# Patient Record
Sex: FEMALE | Race: WHITE | NOT HISPANIC OR LATINO | Employment: OTHER | URBAN - METROPOLITAN AREA
[De-identification: names, ages, dates, MRNs, and addresses within clinical notes are randomized per-mention and may not be internally consistent; named-entity substitution may affect disease eponyms.]

---

## 2023-03-01 LAB
ALANINE AMINOTRANSFERASE (SGPT) (U/L) IN SER/PLAS: 15 U/L (ref 7–45)
ALBUMIN (G/DL) IN SER/PLAS: 4.3 G/DL (ref 3.4–5)
ALKALINE PHOSPHATASE (U/L) IN SER/PLAS: 58 U/L (ref 33–136)
ANION GAP IN SER/PLAS: 12 MMOL/L (ref 10–20)
ASPARTATE AMINOTRANSFERASE (SGOT) (U/L) IN SER/PLAS: 25 U/L (ref 9–39)
BILIRUBIN TOTAL (MG/DL) IN SER/PLAS: 0.7 MG/DL (ref 0–1.2)
CALCIDIOL (25 OH VITAMIN D3) (NG/ML) IN SER/PLAS: 34 NG/ML
CALCIUM (MG/DL) IN SER/PLAS: 10.2 MG/DL (ref 8.6–10.3)
CARBON DIOXIDE, TOTAL (MMOL/L) IN SER/PLAS: 27 MMOL/L (ref 21–32)
CHLORIDE (MMOL/L) IN SER/PLAS: 103 MMOL/L (ref 98–107)
CHOLESTEROL (MG/DL) IN SER/PLAS: 228 MG/DL (ref 0–199)
CHOLESTEROL IN HDL (MG/DL) IN SER/PLAS: 64.6 MG/DL
CHOLESTEROL/HDL RATIO: 3.5
CREATININE (MG/DL) IN SER/PLAS: 0.94 MG/DL (ref 0.5–1.05)
ERYTHROCYTE DISTRIBUTION WIDTH (RATIO) BY AUTOMATED COUNT: 15.4 % (ref 11.5–14.5)
ERYTHROCYTE MEAN CORPUSCULAR HEMOGLOBIN CONCENTRATION (G/DL) BY AUTOMATED: 32.4 G/DL (ref 32–36)
ERYTHROCYTE MEAN CORPUSCULAR VOLUME (FL) BY AUTOMATED COUNT: 89 FL (ref 80–100)
ERYTHROCYTES (10*6/UL) IN BLOOD BY AUTOMATED COUNT: 4.6 X10E12/L (ref 4–5.2)
GFR FEMALE: 64 ML/MIN/1.73M2
GLUCOSE (MG/DL) IN SER/PLAS: 98 MG/DL (ref 74–99)
HEMATOCRIT (%) IN BLOOD BY AUTOMATED COUNT: 41.1 % (ref 36–46)
HEMOGLOBIN (G/DL) IN BLOOD: 13.3 G/DL (ref 12–16)
LDL: 125 MG/DL (ref 0–99)
LEUKOCYTES (10*3/UL) IN BLOOD BY AUTOMATED COUNT: 5.3 X10E9/L (ref 4.4–11.3)
PLATELETS (10*3/UL) IN BLOOD AUTOMATED COUNT: 250 X10E9/L (ref 150–450)
POTASSIUM (MMOL/L) IN SER/PLAS: 4.2 MMOL/L (ref 3.5–5.3)
PROTEIN TOTAL: 7.6 G/DL (ref 6.4–8.2)
SODIUM (MMOL/L) IN SER/PLAS: 138 MMOL/L (ref 136–145)
THYROTROPIN (MIU/L) IN SER/PLAS BY DETECTION LIMIT <= 0.05 MIU/L: 1.5 MIU/L (ref 0.44–3.98)
TRIGLYCERIDE (MG/DL) IN SER/PLAS: 190 MG/DL (ref 0–149)
UREA NITROGEN (MG/DL) IN SER/PLAS: 18 MG/DL (ref 6–23)
VLDL: 38 MG/DL (ref 0–40)

## 2023-07-17 PROBLEM — I83.819 VARICOSE VEINS OF LEG WITH PAIN: Status: ACTIVE | Noted: 2023-07-17

## 2023-07-17 PROBLEM — N39.0 ACUTE UTI: Status: ACTIVE | Noted: 2023-07-17

## 2023-07-17 PROBLEM — R06.02 SHORTNESS OF BREATH: Status: ACTIVE | Noted: 2023-07-17

## 2023-07-17 PROBLEM — J06.9 URI, ACUTE: Status: ACTIVE | Noted: 2023-07-17

## 2023-07-17 PROBLEM — M20.42 ACQUIRED HAMMERTOE OF LEFT FOOT: Status: ACTIVE | Noted: 2023-07-17

## 2023-07-17 PROBLEM — L25.5 DERMATITIS DUE TO PLANTS, INCLUDING POISON IVY, SUMAC, AND OAK: Status: ACTIVE | Noted: 2023-07-17

## 2023-07-17 PROBLEM — M47.816 LUMBAR SPONDYLOSIS: Status: ACTIVE | Noted: 2023-07-17

## 2023-07-17 PROBLEM — R53.83 FATIGUE: Status: ACTIVE | Noted: 2023-07-17

## 2023-07-17 PROBLEM — H61.23 BILATERAL IMPACTED CERUMEN: Status: ACTIVE | Noted: 2023-07-17

## 2023-07-17 PROBLEM — L23.7 POISON IVY DERMATITIS: Status: ACTIVE | Noted: 2023-07-17

## 2023-07-17 PROBLEM — N39.0 URINARY TRACT INFECTION: Status: ACTIVE | Noted: 2023-07-17

## 2023-07-17 PROBLEM — R32 URINARY INCONTINENCE: Status: ACTIVE | Noted: 2023-07-17

## 2023-07-17 PROBLEM — F33.1 MAJOR DEPRESSIVE DISORDER, RECURRENT, MODERATE (MULTI): Status: ACTIVE | Noted: 2023-07-17

## 2023-07-17 PROBLEM — M54.30 SCIATICA: Status: ACTIVE | Noted: 2023-07-17

## 2023-07-17 PROBLEM — R39.9 UTI SYMPTOMS: Status: ACTIVE | Noted: 2023-07-17

## 2023-07-17 PROBLEM — R92.2 INCONCLUSIVE MAMMOGRAM: Status: ACTIVE | Noted: 2023-07-17

## 2023-07-17 PROBLEM — K64.4 EXTERNAL HEMORRHOIDS: Status: ACTIVE | Noted: 2023-07-17

## 2023-07-17 PROBLEM — M25.512 LEFT SHOULDER PAIN: Status: ACTIVE | Noted: 2023-07-17

## 2023-07-17 PROBLEM — L98.9 SKIN LESIONS: Status: ACTIVE | Noted: 2023-07-17

## 2023-07-17 PROBLEM — K64.8 INTERNAL HEMORRHOIDS: Status: ACTIVE | Noted: 2023-07-17

## 2023-07-17 PROBLEM — M20.12 HALLUX VALGUS, LEFT: Status: ACTIVE | Noted: 2023-07-17

## 2023-07-17 PROBLEM — F32.A DEPRESSION: Status: ACTIVE | Noted: 2023-07-17

## 2023-07-17 PROBLEM — M54.16 LUMBAR RADICULITIS: Status: ACTIVE | Noted: 2023-07-17

## 2023-07-17 PROBLEM — B37.0 THRUSH: Status: ACTIVE | Noted: 2023-07-17

## 2023-07-17 PROBLEM — H93.11 TINNITUS OF RIGHT EAR: Status: ACTIVE | Noted: 2023-07-17

## 2023-07-17 PROBLEM — R06.2 WHEEZE: Status: ACTIVE | Noted: 2023-07-17

## 2023-07-17 PROBLEM — L85.3 DRY SKIN: Status: ACTIVE | Noted: 2023-07-17

## 2023-07-17 PROBLEM — H91.93 HEARING PROBLEM OF BOTH EARS: Status: ACTIVE | Noted: 2023-07-17

## 2023-07-17 PROBLEM — S20.219A RIB CONTUSION: Status: ACTIVE | Noted: 2023-07-17

## 2023-07-17 PROBLEM — S61.219A FINGER LACERATION: Status: ACTIVE | Noted: 2023-07-17

## 2023-07-17 PROBLEM — D12.6 TUBULAR ADENOMA OF COLON: Status: ACTIVE | Noted: 2023-07-17

## 2023-07-17 PROBLEM — M26.659 TMJ ARTHROPATHY: Status: ACTIVE | Noted: 2023-07-17

## 2023-07-17 PROBLEM — E78.5 HYPERLIPIDEMIA: Status: ACTIVE | Noted: 2023-07-17

## 2023-07-17 PROBLEM — E55.9 VITAMIN D DEFICIENCY: Status: ACTIVE | Noted: 2023-07-17

## 2023-07-17 PROBLEM — M53.3 SI (SACROILIAC) JOINT DYSFUNCTION: Status: ACTIVE | Noted: 2023-07-17

## 2023-07-17 PROBLEM — I49.3 PREMATURE VENTRICULAR CONTRACTION ON ELECTROCARDIOGRAM: Status: ACTIVE | Noted: 2023-07-17

## 2023-07-17 PROBLEM — M16.9 OSTEOARTHRITIS OF HIP: Status: ACTIVE | Noted: 2023-07-17

## 2023-07-17 PROBLEM — M85.80 OSTEOPENIA: Status: ACTIVE | Noted: 2023-07-17

## 2023-07-17 PROBLEM — J02.0 ACUTE STREPTOCOCCAL PHARYNGITIS: Status: ACTIVE | Noted: 2023-07-17

## 2023-07-17 PROBLEM — J02.9 SORE THROAT: Status: ACTIVE | Noted: 2023-07-17

## 2023-07-17 PROBLEM — K62.5 RECTAL BLEEDING: Status: ACTIVE | Noted: 2023-07-17

## 2023-07-17 RX ORDER — CHOLECALCIFEROL (VITAMIN D3) 25 MCG
TABLET ORAL
COMMUNITY
Start: 2020-01-14

## 2023-07-17 RX ORDER — CLOTRIMAZOLE 10 MG/1
LOZENGE ORAL; TOPICAL
COMMUNITY
Start: 2022-07-15 | End: 2023-07-19 | Stop reason: ALTCHOICE

## 2023-07-17 RX ORDER — NAPROXEN 500 MG/1
TABLET ORAL EVERY 12 HOURS
COMMUNITY
Start: 2015-04-07 | End: 2023-07-19 | Stop reason: SDUPTHER

## 2023-07-17 RX ORDER — FLUOXETINE HYDROCHLORIDE 20 MG/1
1 CAPSULE ORAL DAILY
COMMUNITY
Start: 2014-02-17 | End: 2024-01-19 | Stop reason: SDUPTHER

## 2023-07-17 RX ORDER — GABAPENTIN 100 MG/1
1 CAPSULE ORAL 3 TIMES DAILY
COMMUNITY
Start: 2022-01-27 | End: 2023-07-19 | Stop reason: SDUPTHER

## 2023-07-19 ENCOUNTER — OFFICE VISIT (OUTPATIENT)
Dept: PRIMARY CARE | Facility: CLINIC | Age: 73
End: 2023-07-19
Payer: MEDICARE

## 2023-07-19 VITALS
WEIGHT: 153.2 LBS | HEART RATE: 77 BPM | HEIGHT: 64 IN | OXYGEN SATURATION: 95 % | TEMPERATURE: 97.9 F | DIASTOLIC BLOOD PRESSURE: 71 MMHG | RESPIRATION RATE: 14 BRPM | SYSTOLIC BLOOD PRESSURE: 120 MMHG | BODY MASS INDEX: 26.15 KG/M2

## 2023-07-19 DIAGNOSIS — R41.3 MEMORY CHANGES: ICD-10-CM

## 2023-07-19 DIAGNOSIS — M54.16 LUMBAR RADICULITIS: Primary | ICD-10-CM

## 2023-07-19 DIAGNOSIS — E78.2 MIXED HYPERLIPIDEMIA: ICD-10-CM

## 2023-07-19 DIAGNOSIS — F33.1 MAJOR DEPRESSIVE DISORDER, RECURRENT, MODERATE (MULTI): ICD-10-CM

## 2023-07-19 DIAGNOSIS — I83.813 VARICOSE VEINS OF BOTH LOWER EXTREMITIES WITH PAIN: ICD-10-CM

## 2023-07-19 PROCEDURE — 1036F TOBACCO NON-USER: CPT | Performed by: INTERNAL MEDICINE

## 2023-07-19 PROCEDURE — 99214 OFFICE O/P EST MOD 30 MIN: CPT | Performed by: INTERNAL MEDICINE

## 2023-07-19 PROCEDURE — 1160F RVW MEDS BY RX/DR IN RCRD: CPT | Performed by: INTERNAL MEDICINE

## 2023-07-19 PROCEDURE — 1159F MED LIST DOCD IN RCRD: CPT | Performed by: INTERNAL MEDICINE

## 2023-07-19 PROCEDURE — 1125F AMNT PAIN NOTED PAIN PRSNT: CPT | Performed by: INTERNAL MEDICINE

## 2023-07-19 RX ORDER — NAPROXEN 500 MG/1
500 TABLET ORAL EVERY 12 HOURS
Qty: 90 TABLET | Refills: 3 | Status: SHIPPED | OUTPATIENT
Start: 2023-07-19

## 2023-07-19 RX ORDER — GABAPENTIN 100 MG/1
100 CAPSULE ORAL 3 TIMES DAILY
Qty: 270 CAPSULE | Refills: 0 | Status: SHIPPED | OUTPATIENT
Start: 2023-07-19 | End: 2023-12-07

## 2023-07-19 ASSESSMENT — ENCOUNTER SYMPTOMS
EYE REDNESS: 0
CARDIOVASCULAR NEGATIVE: 1
COLOR CHANGE: 0
EYE PAIN: 0
JOINT SWELLING: 0
POLYPHAGIA: 0
FLANK PAIN: 0
SEIZURES: 0
BLOOD IN STOOL: 0
OCCASIONAL FEELINGS OF UNSTEADINESS: 0
FACIAL ASYMMETRY: 0
DIFFICULTY URINATING: 0
WOUND: 0
VOICE CHANGE: 0
NERVOUS/ANXIOUS: 0
SHORTNESS OF BREATH: 0
BRUISES/BLEEDS EASILY: 0
LIGHT-HEADEDNESS: 0
NEUROLOGICAL NEGATIVE: 1
COUGH: 0
PALPITATIONS: 0
ABDOMINAL PAIN: 0
VOMITING: 0
APPETITE CHANGE: 0
BACK PAIN: 1
ALLERGIC/IMMUNOLOGIC NEGATIVE: 1
NAUSEA: 0
TROUBLE SWALLOWING: 0
WEAKNESS: 0
SINUS PRESSURE: 0
WHEEZING: 0
EYES NEGATIVE: 1
HEMATOLOGIC/LYMPHATIC NEGATIVE: 1
LOSS OF SENSATION IN FEET: 0
ARTHRALGIAS: 1
DIZZINESS: 0
CONFUSION: 0
EYE DISCHARGE: 0
HALLUCINATIONS: 0
CONSTIPATION: 0
TREMORS: 0
AGITATION: 0
SLEEP DISTURBANCE: 0
NECK PAIN: 0
DYSURIA: 0
MYALGIAS: 0
DEPRESSION: 0
DIARRHEA: 0
HEADACHES: 0
PSYCHIATRIC NEGATIVE: 1
STRIDOR: 0
NECK STIFFNESS: 0
ADENOPATHY: 0
POLYDIPSIA: 0
ENDOCRINE NEGATIVE: 1
SORE THROAT: 0
FREQUENCY: 0
ACTIVITY CHANGE: 0
SINUS PAIN: 0
UNEXPECTED WEIGHT CHANGE: 0
NUMBNESS: 0
CHEST TIGHTNESS: 0
GASTROINTESTINAL NEGATIVE: 1
SPEECH DIFFICULTY: 0
RESPIRATORY NEGATIVE: 1
CONSTITUTIONAL NEGATIVE: 1

## 2023-07-19 ASSESSMENT — PATIENT HEALTH QUESTIONNAIRE - PHQ9
SUM OF ALL RESPONSES TO PHQ9 QUESTIONS 1 AND 2: 0
2. FEELING DOWN, DEPRESSED OR HOPELESS: NOT AT ALL
1. LITTLE INTEREST OR PLEASURE IN DOING THINGS: NOT AT ALL

## 2023-07-19 NOTE — PROGRESS NOTES
Subjective   Patient ID: Zaki Terry is a 72 y.o. female who presents for Follow-up (Patient is here for a follow up./Patient states she is forgetting words and can't finish a sentence. ).  HPI    Patient comes for f/up visit.   Patient has been feeling pretty good and has been complaint with prescribed medications.  She and her  noticed progressive difficulties remembering words and finishing sentences.  No family h/o dementia. There is a fam h/o CVA, CAD.    Her 98 yo mother is still alive. There is a h/o longevity on mother side of family.       Patient is requesting refill on gabapentin initially prescribed by pain management, she no longer sees pain management, gabapentin helps with radiculopathy sx.     She continues fluoxetine for anxiet sx.     Review of Systems   Constitutional: Negative.  Negative for activity change, appetite change and unexpected weight change.   HENT: Negative.  Negative for congestion, ear discharge, ear pain, hearing loss, mouth sores, nosebleeds, sinus pressure, sinus pain, sore throat, trouble swallowing and voice change.    Eyes: Negative.  Negative for pain, discharge, redness and visual disturbance.   Respiratory: Negative.  Negative for cough, chest tightness, shortness of breath, wheezing and stridor.    Cardiovascular: Negative.  Negative for chest pain, palpitations and leg swelling.   Gastrointestinal: Negative.  Negative for abdominal pain, blood in stool, constipation, diarrhea, nausea and vomiting.   Endocrine: Negative.  Negative for polydipsia, polyphagia and polyuria.   Genitourinary: Negative.  Negative for difficulty urinating, dysuria, flank pain, frequency and urgency.   Musculoskeletal:  Positive for arthralgias and back pain. Negative for gait problem, joint swelling, myalgias, neck pain and neck stiffness.   Skin: Negative.  Negative for color change, rash and wound.   Allergic/Immunologic: Negative.  Negative for environmental allergies, food  "allergies and immunocompromised state.   Neurological: Negative.  Negative for dizziness, tremors, seizures, syncope, facial asymmetry, speech difficulty, weakness, light-headedness, numbness and headaches.   Hematological: Negative.  Negative for adenopathy. Does not bruise/bleed easily.   Psychiatric/Behavioral: Negative.  Negative for agitation, behavioral problems, confusion, hallucinations, sleep disturbance and suicidal ideas. The patient is not nervous/anxious.    All other systems reviewed and are negative.      Objective     Review of systems was performed and all systems were negative except what in HPI    /71   Pulse 77   Temp 36.6 °C (97.9 °F)   Resp 14   Ht 1.613 m (5' 3.5\")   Wt 69.5 kg (153 lb 3.2 oz)   SpO2 95%   BMI 26.71 kg/m²    Physical Exam  Vitals and nursing note reviewed.   Constitutional:       General: She is not in acute distress.     Appearance: Normal appearance.   HENT:      Head: Normocephalic and atraumatic.      Right Ear: External ear normal.      Left Ear: External ear normal.      Nose: Nose normal. No congestion or rhinorrhea.   Eyes:      General:         Right eye: No discharge.         Left eye: No discharge.      Extraocular Movements: Extraocular movements intact.      Conjunctiva/sclera: Conjunctivae normal.      Pupils: Pupils are equal, round, and reactive to light.   Cardiovascular:      Rate and Rhythm: Normal rate and regular rhythm.      Pulses: Normal pulses.      Heart sounds: Normal heart sounds. No murmur heard.     No friction rub. No gallop.   Pulmonary:      Effort: Pulmonary effort is normal. No respiratory distress.      Breath sounds: Normal breath sounds. No stridor. No wheezing, rhonchi or rales.   Chest:      Chest wall: No tenderness.   Abdominal:      General: Bowel sounds are normal.      Palpations: Abdomen is soft. There is no mass.      Tenderness: There is no abdominal tenderness. There is no guarding or rebound.   Musculoskeletal:    "      General: No swelling or deformity. Normal range of motion.      Cervical back: Normal range of motion and neck supple. No rigidity or tenderness.      Right lower leg: No edema.      Left lower leg: No edema.   Lymphadenopathy:      Cervical: No cervical adenopathy.   Skin:     General: Skin is warm and dry.      Coloration: Skin is not jaundiced.      Findings: No bruising, erythema or rash.   Neurological:      General: No focal deficit present.      Mental Status: She is alert and oriented to person, place, and time. Mental status is at baseline.      Cranial Nerves: No cranial nerve deficit.      Motor: No weakness.      Coordination: Coordination normal.      Gait: Gait normal.   Psychiatric:         Mood and Affect: Mood normal.         Behavior: Behavior normal.         Thought Content: Thought content normal.         Judgment: Judgment normal.         Assessment/Plan   Problem List Items Addressed This Visit          Cardiac and Vasculature    Hyperlipidemia     Low cholesterol and low carbohydrate diet is advised.          Varicose veins of leg with pain     Compression stockings advised.             Mental Health    Major depressive disorder, recurrent, moderate (CMS/HCC)     Clinically stable. Continue Fuoxetine. Will monitor.             Neuro    Lumbar radiculitis - Primary    Relevant Medications    gabapentin (Neurontin) 100 mg capsule    naproxen (Naprosyn) 500 mg tablet    Memory changes    Relevant Orders    CT head wo IV contrast    Referral to Neurology   It was a pleasure to see you today.  I would like to remind you about importance of a healthy lifestyle in order to improve your well-being and live longer.  Try to engage in physical activities for at least 150 minutes per week.  Eat about 10 servings of fruits and vegetables daily. My advice is 2 servings of fruits and 8 servings of vegetables.  For vegetables choose at least half of them green and at least half of them fresh.  Please  avoid sugar, salt, fried food and saturated fat.    I spent a total of 30 minutes on the date of service for follow up visit, which included preparing to see the patient, face-to-face patient care, completing clinical documentation, obtaining and/or reviewing separately obtained history, performing a medically appropriate examination, counseling and educating the patient/family/caregiver, ordering medications, tests, or procedures, communicating with other health care providers (not separately reported), independently interpreting results (not separately reported), communicating results to the patient/family/caregiver, and care coordination (not separately reported).      F/up in jan 2024 or sooner if needed.

## 2023-10-30 ENCOUNTER — ANCILLARY PROCEDURE (OUTPATIENT)
Dept: RADIOLOGY | Facility: CLINIC | Age: 73
End: 2023-10-30
Payer: MEDICARE

## 2023-10-30 DIAGNOSIS — R41.3 OTHER AMNESIA: ICD-10-CM

## 2023-10-30 PROCEDURE — 70450 CT HEAD/BRAIN W/O DYE: CPT | Performed by: RADIOLOGY

## 2023-10-30 PROCEDURE — 70450 CT HEAD/BRAIN W/O DYE: CPT

## 2023-11-20 ENCOUNTER — OFFICE VISIT (OUTPATIENT)
Dept: NEUROLOGY | Facility: CLINIC | Age: 73
End: 2023-11-20
Payer: MEDICARE

## 2023-11-20 ENCOUNTER — LAB (OUTPATIENT)
Dept: LAB | Facility: LAB | Age: 73
End: 2023-11-20
Payer: MEDICARE

## 2023-11-20 VITALS
TEMPERATURE: 97.3 F | HEART RATE: 56 BPM | SYSTOLIC BLOOD PRESSURE: 145 MMHG | WEIGHT: 155.5 LBS | DIASTOLIC BLOOD PRESSURE: 78 MMHG | BODY MASS INDEX: 27.55 KG/M2 | HEIGHT: 63 IN

## 2023-11-20 DIAGNOSIS — R41.89 COGNITIVE CHANGES: ICD-10-CM

## 2023-11-20 DIAGNOSIS — R41.89 COGNITIVE CHANGES: Primary | ICD-10-CM

## 2023-11-20 LAB — VIT B12 SERPL-MCNC: 746 PG/ML (ref 211–911)

## 2023-11-20 PROCEDURE — 99204 OFFICE O/P NEW MOD 45 MIN: CPT | Performed by: PSYCHIATRY & NEUROLOGY

## 2023-11-20 PROCEDURE — 1125F AMNT PAIN NOTED PAIN PRSNT: CPT | Performed by: PSYCHIATRY & NEUROLOGY

## 2023-11-20 PROCEDURE — 82607 VITAMIN B-12: CPT

## 2023-11-20 PROCEDURE — 1036F TOBACCO NON-USER: CPT | Performed by: PSYCHIATRY & NEUROLOGY

## 2023-11-20 PROCEDURE — 36415 COLL VENOUS BLD VENIPUNCTURE: CPT

## 2023-11-20 PROCEDURE — 1160F RVW MEDS BY RX/DR IN RCRD: CPT | Performed by: PSYCHIATRY & NEUROLOGY

## 2023-11-20 PROCEDURE — 1159F MED LIST DOCD IN RCRD: CPT | Performed by: PSYCHIATRY & NEUROLOGY

## 2023-11-20 ASSESSMENT — PATIENT HEALTH QUESTIONNAIRE - PHQ9
SUM OF ALL RESPONSES TO PHQ9 QUESTIONS 1 & 2: 0
1. LITTLE INTEREST OR PLEASURE IN DOING THINGS: NOT AT ALL
2. FEELING DOWN, DEPRESSED OR HOPELESS: NOT AT ALL

## 2023-11-20 ASSESSMENT — LIFESTYLE VARIABLES
SKIP TO QUESTIONS 9-10: 1
HOW MANY STANDARD DRINKS CONTAINING ALCOHOL DO YOU HAVE ON A TYPICAL DAY: 1 OR 2
HOW OFTEN DO YOU HAVE A DRINK CONTAINING ALCOHOL: MONTHLY OR LESS
AUDIT-C TOTAL SCORE: 1
HOW OFTEN DO YOU HAVE SIX OR MORE DRINKS ON ONE OCCASION: NEVER

## 2023-11-20 NOTE — PROGRESS NOTES
Consulting Physician: Dr. Carnes    Chief Complaint: Cognitive Changes    History Of Present Illness  Zaki Terry is a 72 y.o. female presenting with cognitive changes.    Over the past year, she has noted changes in cognition.  She describes word finding difficulty.  For example, she will be in the middle of a sentence and she cannot think of the right word. She also notes that she often cannot finish a sentence.  She isn't sure if her mind is wandering which is the reason why she doesn't finish sentences.  She denies any difficulty understanding speech.  She also denies any problems with writing or reading.  She denies any difficulty with remembering recent conversations or recent events.  She does not tend to repeat herself.  She denies any difficulty with following instructions.  She denies any difficulty with driving.  During the day, she reads, goes swimming, helps her mother, cooks, mows the lawn, and cleans her house.  She manages her medications.  She also manages her mother's bills/finances.  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  The patient denies any double vision, loss of peripheral vision, slurred speech, difficulty getting the words out, facial droop, facial numbness, focal weakness, focal numbness, loss of coordination, or loss of balance.             Past Medical History  Past Medical History:   Diagnosis Date    Encounter for screening for malignant neoplasm of skin 10/18/2016    Skin cancer screening    Impacted cerumen, left ear 03/16/2022    Impacted cerumen of left ear    Impacted cerumen, right ear 03/16/2022    Impacted cerumen of right ear    Personal history of other medical treatment     History of blood transfusion    Unspecified hearing loss, bilateral 10/31/2017    Bilateral hearing loss    Unspecified hearing loss, right ear 03/16/2022    Hearing loss in right ear       Surgical History  Past Surgical History:   Procedure Laterality Date     "APPENDECTOMY  02/17/2014    Appendectomy    OTHER SURGICAL HISTORY  02/17/2014    Jaw Fracture Treatment Simple Mandible - Closed Reduction    OTHER SURGICAL HISTORY  01/20/2015    Endovenous Ablation Of Incompetent Vein Radiofrequency       Family History  Family History   Problem Relation Name Age of Onset    Hyperlipidemia Mother      Other (cardiac disorder) Father      Diabetes Father      Skin cancer Father      Diabetes Sister          Social History   reports that she has quit smoking. Her smoking use included cigarettes. She has never been exposed to tobacco smoke. She has never used smokeless tobacco. She reports current alcohol use. She reports that she does not use drugs.     Allergies  Meperidine    Medications    Current Outpatient Medications:     cholecalciferol (Vitamin D-3) 25 MCG (1000 UT) tablet, Take by mouth., Disp: , Rfl:     FLUoxetine (PROzac) 20 mg capsule, Take 1 capsule (20 mg) by mouth once daily., Disp: , Rfl:     naproxen (Naprosyn) 500 mg tablet, Take 1 tablet (500 mg) by mouth every 12 hours., Disp: 90 tablet, Rfl: 3    gabapentin (Neurontin) 100 mg capsule, Take 1 capsule (100 mg) by mouth 3 times a day., Disp: 270 capsule, Rfl: 0      Last Recorded Vitals   Blood pressure 145/78, pulse 56, temperature 36.3 °C (97.3 °F), temperature source Temporal, height 1.6 m (5' 3\"), weight 70.5 kg (155 lb 8 oz).    Objective:  Gen: NAD  Neuro:  --HIF:  Mini-Mental Status Exam:  Orientation to Time (Year, Season, Month, Date, Day of Week): 5  Orientation to Place (State, County, City, Name of Place, Floor):  5  Registration (Apple, Table, Lotus): 3  Attention (Spell 'World' backwards): 5  Delayed Verbal Recall: 2  Naming (Watch, Pen): 2  Repetition (No Ifs, Ands, or Buts): 1  Follows command with crossover: 3  Read a sentence: 1  Write a sentence: 1  Copy a figure: 1  Total Score:     --CN:  PERRLA, EOMI, VFF, no visible facial asymmetry, facial sensation intact, no tongue or palatal deviation, " "SCM intact  --Motor: Moves all 4 extremities equally; no focal deficits  --Sensory: Intact to light touch, intact to pinprick  --Reflex: 2+ symmetric, toes down  --Cerebellum: FTN and HTS intact  --Gait: Normal, narrow based.  Toe and Heal Walking Intact.  Tandem Intact    Relevant Results  Lab Results   Component Value Date    WBC 5.3 03/01/2023    HGB 13.3 03/01/2023    HCT 41.1 03/01/2023    MCV 89 03/01/2023     03/01/2023       Lab Results   Component Value Date    GLUCOSE 98 03/01/2023    CALCIUM 10.2 03/01/2023     03/01/2023    K 4.2 03/01/2023    CO2 27 03/01/2023     03/01/2023    BUN 18 03/01/2023    CREATININE 0.94 03/01/2023       No results found for: \"HGBA1C\"    Lab Results   Component Value Date    CHOL 228 (H) 03/01/2023    CHOL 196 01/28/2022    CHOL 238 (H) 02/02/2021     Lab Results   Component Value Date    HDL 64.6 03/01/2023    HDL 56.0 01/28/2022    HDL 59.0 02/02/2021     No results found for: \"LDLCALC\"  Lab Results   Component Value Date    TRIG 190 (H) 03/01/2023    TRIG 158 (H) 01/28/2022    TRIG 177 (H) 02/02/2021     No components found for: \"CHOLHDL\"       CT Brain (I personally reviewed the images/tracings with the following interpretation)  Mild atrophy noted; slight dilation of right temporal horn of the lateral ventricle    Assessment:  This is a 72 year old female presenting for evaluation of cognitive changes.  For the past year, she has noted difficulty with speech. Specifically, she notes word finding difficulty and completing sentences.  She is very independent.  On exam, her MMSE was 29/30.  Ddx include the onset of PPA.    Recommend NP testing  Check B12 level        Grant Pena MD  [facility]  Department of Neurology      A copy of this note was sent to the referring provider.    "

## 2023-12-07 DIAGNOSIS — M54.16 LUMBAR RADICULITIS: ICD-10-CM

## 2023-12-07 RX ORDER — GABAPENTIN 100 MG/1
100 CAPSULE ORAL 3 TIMES DAILY
Qty: 270 CAPSULE | Refills: 0 | Status: SHIPPED | OUTPATIENT
Start: 2023-12-07 | End: 2024-01-19 | Stop reason: SDUPTHER

## 2024-01-19 ENCOUNTER — OFFICE VISIT (OUTPATIENT)
Dept: PRIMARY CARE | Facility: CLINIC | Age: 74
End: 2024-01-19
Payer: MEDICARE

## 2024-01-19 ENCOUNTER — APPOINTMENT (OUTPATIENT)
Dept: PRIMARY CARE | Facility: CLINIC | Age: 74
End: 2024-01-19
Payer: MEDICARE

## 2024-01-19 VITALS
BODY MASS INDEX: 26.75 KG/M2 | DIASTOLIC BLOOD PRESSURE: 84 MMHG | HEART RATE: 81 BPM | TEMPERATURE: 96.6 F | WEIGHT: 151 LBS | OXYGEN SATURATION: 98 % | HEIGHT: 63 IN | SYSTOLIC BLOOD PRESSURE: 137 MMHG | RESPIRATION RATE: 16 BRPM

## 2024-01-19 DIAGNOSIS — M54.16 LUMBAR RADICULITIS: ICD-10-CM

## 2024-01-19 DIAGNOSIS — F33.1 MAJOR DEPRESSIVE DISORDER, RECURRENT, MODERATE (MULTI): ICD-10-CM

## 2024-01-19 DIAGNOSIS — Z12.31 ENCOUNTER FOR SCREENING MAMMOGRAM FOR BREAST CANCER: ICD-10-CM

## 2024-01-19 DIAGNOSIS — R53.83 OTHER FATIGUE: ICD-10-CM

## 2024-01-19 DIAGNOSIS — M85.89 OSTEOPENIA OF MULTIPLE SITES: ICD-10-CM

## 2024-01-19 DIAGNOSIS — E55.9 VITAMIN D DEFICIENCY: ICD-10-CM

## 2024-01-19 DIAGNOSIS — Z00.00 ROUTINE GENERAL MEDICAL EXAMINATION AT HEALTH CARE FACILITY: Primary | ICD-10-CM

## 2024-01-19 DIAGNOSIS — R41.3 MEMORY CHANGES: ICD-10-CM

## 2024-01-19 DIAGNOSIS — M21.949 DEFORMITY OF HAND, UNSPECIFIED LATERALITY: ICD-10-CM

## 2024-01-19 DIAGNOSIS — E78.2 MIXED HYPERLIPIDEMIA: ICD-10-CM

## 2024-01-19 DIAGNOSIS — Z78.0 ASYMPTOMATIC MENOPAUSAL STATE: ICD-10-CM

## 2024-01-19 PROCEDURE — 1036F TOBACCO NON-USER: CPT | Performed by: INTERNAL MEDICINE

## 2024-01-19 PROCEDURE — 1170F FXNL STATUS ASSESSED: CPT | Performed by: INTERNAL MEDICINE

## 2024-01-19 PROCEDURE — 1125F AMNT PAIN NOTED PAIN PRSNT: CPT | Performed by: INTERNAL MEDICINE

## 2024-01-19 PROCEDURE — 1160F RVW MEDS BY RX/DR IN RCRD: CPT | Performed by: INTERNAL MEDICINE

## 2024-01-19 PROCEDURE — 1159F MED LIST DOCD IN RCRD: CPT | Performed by: INTERNAL MEDICINE

## 2024-01-19 PROCEDURE — G0439 PPPS, SUBSEQ VISIT: HCPCS | Performed by: INTERNAL MEDICINE

## 2024-01-19 PROCEDURE — 99214 OFFICE O/P EST MOD 30 MIN: CPT | Performed by: INTERNAL MEDICINE

## 2024-01-19 RX ORDER — FLUOXETINE HYDROCHLORIDE 20 MG/1
20 CAPSULE ORAL DAILY
Qty: 90 CAPSULE | Refills: 3 | Status: SHIPPED | OUTPATIENT
Start: 2024-01-19

## 2024-01-19 RX ORDER — GABAPENTIN 100 MG/1
100 CAPSULE ORAL 3 TIMES DAILY
Qty: 270 CAPSULE | Refills: 0 | Status: SHIPPED | OUTPATIENT
Start: 2024-01-19

## 2024-01-19 ASSESSMENT — ACTIVITIES OF DAILY LIVING (ADL)
DOING_HOUSEWORK: INDEPENDENT
DRESSING: INDEPENDENT
TAKING_MEDICATION: INDEPENDENT
BATHING: INDEPENDENT
GROCERY_SHOPPING: INDEPENDENT
MANAGING_FINANCES: INDEPENDENT

## 2024-01-19 ASSESSMENT — PATIENT HEALTH QUESTIONNAIRE - PHQ9
1. LITTLE INTEREST OR PLEASURE IN DOING THINGS: NOT AT ALL
SUM OF ALL RESPONSES TO PHQ9 QUESTIONS 1 AND 2: 0
2. FEELING DOWN, DEPRESSED OR HOPELESS: NOT AT ALL

## 2024-01-19 ASSESSMENT — ENCOUNTER SYMPTOMS
ACTIVITY CHANGE: 0
CONSTITUTIONAL NEGATIVE: 1
TREMORS: 0
DIFFICULTY URINATING: 0
DIARRHEA: 0
EYE PAIN: 0
AGITATION: 0
HALLUCINATIONS: 0
SINUS PRESSURE: 0
ABDOMINAL PAIN: 0
FACIAL ASYMMETRY: 0
PSYCHIATRIC NEGATIVE: 1
LIGHT-HEADEDNESS: 0
VOMITING: 0
CARDIOVASCULAR NEGATIVE: 1
CHEST TIGHTNESS: 0
NEUROLOGICAL NEGATIVE: 1
ENDOCRINE NEGATIVE: 1
DIZZINESS: 0
BACK PAIN: 1
TROUBLE SWALLOWING: 0
PALPITATIONS: 0
FLANK PAIN: 0
NUMBNESS: 0
GASTROINTESTINAL NEGATIVE: 1
STRIDOR: 0
SORE THROAT: 0
ALLERGIC/IMMUNOLOGIC NEGATIVE: 1
NECK STIFFNESS: 0
EYE REDNESS: 0
HEMATOLOGIC/LYMPHATIC NEGATIVE: 1
JOINT SWELLING: 0
SPEECH DIFFICULTY: 0
POLYPHAGIA: 0
VOICE CHANGE: 0
SLEEP DISTURBANCE: 0
SINUS PAIN: 0
BLOOD IN STOOL: 0
COUGH: 0
FREQUENCY: 0
OCCASIONAL FEELINGS OF UNSTEADINESS: 0
NECK PAIN: 0
RESPIRATORY NEGATIVE: 1
EYES NEGATIVE: 1
MYALGIAS: 0
ARTHRALGIAS: 1
COLOR CHANGE: 0
SEIZURES: 0
WEAKNESS: 0
WHEEZING: 0
SHORTNESS OF BREATH: 0
DEPRESSION: 0
ADENOPATHY: 0
APPETITE CHANGE: 0
BRUISES/BLEEDS EASILY: 0
LOSS OF SENSATION IN FEET: 0
POLYDIPSIA: 0
DYSURIA: 0
WOUND: 0
CONSTIPATION: 0
NAUSEA: 0
UNEXPECTED WEIGHT CHANGE: 0
HEADACHES: 0
CONFUSION: 0
NERVOUS/ANXIOUS: 0
EYE DISCHARGE: 0

## 2024-01-19 NOTE — PROGRESS NOTES
;Subjective   Reason for Visit: Zaki Terry is an 73 y.o. female here for a Medicare Wellness visit.     Past Medical, Surgical, and Family History reviewed and updated in chart.    Reviewed all medications by prescribing practitioner or clinical pharmacist (such as prescriptions, OTCs, herbal therapies and supplements) and documented in the medical record.    HPI    Patient Care Team:  Tracy Carnes MD PhD as PCP - General  Tracy Carnes MD PhD as PCP - [facility] ACO Attributed Provider     Patient comes for MW and f/up visit.     Patient has been feeling pretty good and has been complaint with prescribed medications.    Concerned about some deformities of palms of both hands, which get painful when performing activities placing pressure on palms of hands.  Will refer to hand ortho for evaluation.    We reviewed and discussed details of recent blood work: CBC, CMP, TSH, Lipid panel, Vit D done in 2023.  Results within acceptable range.  Mildly elevated cholesterol and TG noted.     Last mammogram: 3/23  DEXA: 3/21  Colonoscopy: 2020, next 2027  Pneumonia vacc: 2021: osteopenia  Adv. Dir: on file, discussed, POA confirmed and documented    Patient is requesting refill on gabapentin initially prescribed by pain management, she no longer sees pain management, gabapentin helps with radiculopathy sx.      She continues fluoxetine for anxiety sx.       Patient saw neurologist regarding memory issues.  CT brain showed mild atrophy, slight dilatation of right temporal horn of lateral ventricle.  Advised NP testing and Vit B12 level. I reviewed visit note.   Her Vit Vit [id number] was 746.    She quit smoking more than 30 ys ago.       Review of Systems   Constitutional: Negative.  Negative for activity change, appetite change and unexpected weight change.   HENT: Negative.  Negative for congestion, ear discharge, ear pain, hearing loss, mouth sores, nosebleeds, sinus pressure, sinus pain, sore throat, trouble  "swallowing and voice change.    Eyes: Negative.  Negative for pain, discharge, redness and visual disturbance.   Respiratory: Negative.  Negative for cough, chest tightness, shortness of breath, wheezing and stridor.    Cardiovascular: Negative.  Negative for chest pain, palpitations and leg swelling.   Gastrointestinal: Negative.  Negative for abdominal pain, blood in stool, constipation, diarrhea, nausea and vomiting.   Endocrine: Negative.  Negative for polydipsia, polyphagia and polyuria.   Genitourinary: Negative.  Negative for difficulty urinating, dysuria, flank pain, frequency and urgency.   Musculoskeletal:  Positive for arthralgias and back pain. Negative for gait problem, joint swelling, myalgias, neck pain and neck stiffness.   Skin: Negative.  Negative for color change, rash and wound.   Allergic/Immunologic: Negative.  Negative for environmental allergies, food allergies and immunocompromised state.   Neurological: Negative.  Negative for dizziness, tremors, seizures, syncope, facial asymmetry, speech difficulty, weakness, light-headedness, numbness and headaches.   Hematological: Negative.  Negative for adenopathy. Does not bruise/bleed easily.   Psychiatric/Behavioral: Negative.  Negative for agitation, behavioral problems, confusion, hallucinations, sleep disturbance and suicidal ideas. The patient is not nervous/anxious.    All other systems reviewed and are negative.      Objective   Vitals:  /84   Pulse 81   Temp 35.9 °C (96.6 °F)   Resp 16   Ht 1.6 m (5' 3\")   Wt 68.5 kg (151 lb)   SpO2 98%   BMI 26.75 kg/m²       Physical Exam  Vitals and nursing note reviewed.   Constitutional:       General: She is not in acute distress.     Appearance: Normal appearance.   HENT:      Head: Normocephalic and atraumatic.      Right Ear: Tympanic membrane, ear canal and external ear normal.      Left Ear: Tympanic membrane, ear canal and external ear normal.      Nose: Nose normal. No congestion or " rhinorrhea.      Mouth/Throat:      Mouth: Mucous membranes are moist.      Pharynx: Oropharynx is clear.   Eyes:      General:         Right eye: No discharge.         Left eye: No discharge.      Extraocular Movements: Extraocular movements intact.      Conjunctiva/sclera: Conjunctivae normal.      Pupils: Pupils are equal, round, and reactive to light.   Cardiovascular:      Rate and Rhythm: Normal rate and regular rhythm.      Pulses: Normal pulses.      Heart sounds: Normal heart sounds. No murmur heard.     No friction rub. No gallop.   Pulmonary:      Effort: Pulmonary effort is normal. No respiratory distress.      Breath sounds: Normal breath sounds. No stridor. No wheezing, rhonchi or rales.   Chest:      Chest wall: No tenderness.   Abdominal:      General: Bowel sounds are normal.      Palpations: Abdomen is soft. There is no mass.      Tenderness: There is no abdominal tenderness. There is no guarding or rebound.   Musculoskeletal:         General: No swelling or deformity. Normal range of motion.      Cervical back: Normal range of motion and neck supple. No rigidity or tenderness.      Right lower leg: No edema.      Left lower leg: No edema.      Comments: Dense tissue felt under skin of both palms below base of fingers. Range of motion preserved.   Lymphadenopathy:      Cervical: No cervical adenopathy.   Skin:     General: Skin is warm and dry.      Coloration: Skin is not jaundiced.      Findings: No bruising or erythema.   Neurological:      General: No focal deficit present.      Mental Status: She is alert and oriented to person, place, and time. Mental status is at baseline.      Cranial Nerves: No cranial nerve deficit.      Motor: No weakness.      Coordination: Coordination normal.      Gait: Gait normal.   Psychiatric:         Mood and Affect: Mood normal.         Behavior: Behavior normal.         Thought Content: Thought content normal.         Judgment: Judgment normal.          Assessment/Plan   Problem List Items Addressed This Visit          Cardiac and Vasculature    Hyperlipidemia    Current Assessment & Plan     Low cholesterol and low carbohydrate diet is advised.          Relevant Orders    Comprehensive Metabolic Panel    Lipid Panel       Endocrine/Metabolic    Vitamin D deficiency    Current Assessment & Plan     Continue Vit D supplement.         Relevant Orders    Vitamin D 25-Hydroxy,Total (for eval of Vitamin D levels)       Health Encounters    Routine general medical examination at health care facility - Primary       Mental Health    Major depressive disorder, recurrent, moderate (CMS/HCC)    Current Assessment & Plan     Clinically stable. Continue Fuoxetine. Will monitor.          Relevant Medications    FLUoxetine (PROzac) 20 mg capsule       Musculoskeletal and Injuries    Osteopenia    Current Assessment & Plan     Please maintain enough calcium in your diet:  0137-9507 mg daily (consume foods rich in calcium rather than taking only calcium supplement to meet daily calcium requirements), take daily Vitamin D supplement with meal. Average Vit D dose is 2000 international units daily.  Weight bearing exercise routine is recommended.          Deformity of hand    Relevant Orders    Referral to Orthopaedic Surgery       Neuro    Lumbar radiculitis    Current Assessment & Plan     Clinically stable. Symptoms controlled with Gabapentin.         Relevant Medications    gabapentin (Neurontin) 100 mg capsule    Memory changes    Current Assessment & Plan     Clinically stable. F/up with neurology.            Symptoms and Signs    Fatigue    Relevant Orders    CBC    TSH with reflex to Free T4 if abnormal     Other Visit Diagnoses       Asymptomatic menopausal state        Relevant Orders    XR DEXA bone density    Encounter for screening mammogram for breast cancer        Relevant Orders    BI mammo bilateral screening tomosynthesis        It was a pleasure to see you  today.  I would like to remind you about importance of a healthy lifestyle in order to improve your well-being and live longer.  Try to engage in physical activities for at least 150 minutes per week.  Eat about 10 servings of fruits and vegetables daily. My advice is 2 servings of fruits and 8 servings of vegetables.  For vegetables choose at least half of them green and at least half of them fresh.  Please avoid sugar, salt, fried food and saturated fat.    I spent a total of 30 minutes on the date of service for follow up visit, which included preparing to see the patient, face-to-face patient care, completing clinical documentation, obtaining and/or reviewing separately obtained history, performing a medically appropriate examination, counseling and educating the patient/family/caregiver, ordering medications, tests, or procedures, communicating with other health care providers (not separately reported), independently interpreting results (not separately reported), communicating results to the patient/family/caregiver, and care coordination (not separately reported).    F/up in 6 months or sooner if needed.

## 2024-01-31 ENCOUNTER — LAB (OUTPATIENT)
Dept: LAB | Facility: LAB | Age: 74
End: 2024-01-31
Payer: MEDICARE

## 2024-01-31 DIAGNOSIS — E55.9 VITAMIN D DEFICIENCY: ICD-10-CM

## 2024-01-31 DIAGNOSIS — R53.83 OTHER FATIGUE: ICD-10-CM

## 2024-01-31 DIAGNOSIS — E78.2 MIXED HYPERLIPIDEMIA: ICD-10-CM

## 2024-01-31 LAB
25(OH)D3 SERPL-MCNC: 39 NG/ML (ref 30–100)
ALBUMIN SERPL BCP-MCNC: 4.3 G/DL (ref 3.4–5)
ALP SERPL-CCNC: 60 U/L (ref 33–136)
ALT SERPL W P-5'-P-CCNC: 16 U/L (ref 7–45)
ANION GAP SERPL CALC-SCNC: 13 MMOL/L (ref 10–20)
AST SERPL W P-5'-P-CCNC: 24 U/L (ref 9–39)
BILIRUB SERPL-MCNC: 0.6 MG/DL (ref 0–1.2)
BUN SERPL-MCNC: 16 MG/DL (ref 6–23)
CALCIUM SERPL-MCNC: 9.8 MG/DL (ref 8.6–10.3)
CHLORIDE SERPL-SCNC: 101 MMOL/L (ref 98–107)
CHOLEST SERPL-MCNC: 244 MG/DL (ref 0–199)
CHOLESTEROL/HDL RATIO: 3.9
CO2 SERPL-SCNC: 30 MMOL/L (ref 21–32)
CREAT SERPL-MCNC: 0.89 MG/DL (ref 0.5–1.05)
EGFRCR SERPLBLD CKD-EPI 2021: 68 ML/MIN/1.73M*2
ERYTHROCYTE [DISTWIDTH] IN BLOOD BY AUTOMATED COUNT: 14.6 % (ref 11.5–14.5)
GLUCOSE SERPL-MCNC: 93 MG/DL (ref 74–99)
HCT VFR BLD AUTO: 46 % (ref 36–46)
HDLC SERPL-MCNC: 62.7 MG/DL
HGB BLD-MCNC: 14.9 G/DL (ref 12–16)
LDLC SERPL CALC-MCNC: 142 MG/DL
MCH RBC QN AUTO: 30.5 PG (ref 26–34)
MCHC RBC AUTO-ENTMCNC: 32.4 G/DL (ref 32–36)
MCV RBC AUTO: 94 FL (ref 80–100)
NON HDL CHOLESTEROL: 181 MG/DL (ref 0–149)
NRBC BLD-RTO: 0 /100 WBCS (ref 0–0)
PLATELET # BLD AUTO: 249 X10*3/UL (ref 150–450)
POTASSIUM SERPL-SCNC: 4.5 MMOL/L (ref 3.5–5.3)
PROT SERPL-MCNC: 7.4 G/DL (ref 6.4–8.2)
RBC # BLD AUTO: 4.89 X10*6/UL (ref 4–5.2)
SODIUM SERPL-SCNC: 139 MMOL/L (ref 136–145)
TRIGL SERPL-MCNC: 198 MG/DL (ref 0–149)
TSH SERPL-ACNC: 1.63 MIU/L (ref 0.44–3.98)
VLDL: 40 MG/DL (ref 0–40)
WBC # BLD AUTO: 5.5 X10*3/UL (ref 4.4–11.3)

## 2024-01-31 PROCEDURE — 84443 ASSAY THYROID STIM HORMONE: CPT

## 2024-01-31 PROCEDURE — 80053 COMPREHEN METABOLIC PANEL: CPT

## 2024-01-31 PROCEDURE — 85027 COMPLETE CBC AUTOMATED: CPT

## 2024-01-31 PROCEDURE — 80061 LIPID PANEL: CPT

## 2024-01-31 PROCEDURE — 82306 VITAMIN D 25 HYDROXY: CPT

## 2024-01-31 PROCEDURE — 36415 COLL VENOUS BLD VENIPUNCTURE: CPT

## 2024-02-07 ENCOUNTER — OFFICE VISIT (OUTPATIENT)
Dept: ORTHOPEDIC SURGERY | Facility: CLINIC | Age: 74
End: 2024-02-07
Payer: MEDICARE

## 2024-02-07 ENCOUNTER — HOSPITAL ENCOUNTER (OUTPATIENT)
Dept: RADIOLOGY | Facility: CLINIC | Age: 74
Discharge: HOME | End: 2024-02-07
Payer: MEDICARE

## 2024-02-07 DIAGNOSIS — M72.0 DUPUYTREN'S DISEASE OF PALM OF BOTH HANDS: Primary | ICD-10-CM

## 2024-02-07 DIAGNOSIS — M18.12 OSTEOARTHRITIS OF THUMB, LEFT: ICD-10-CM

## 2024-02-07 DIAGNOSIS — M79.643 PAIN OF HAND, UNSPECIFIED LATERALITY: ICD-10-CM

## 2024-02-07 DIAGNOSIS — M18.11 PRIMARY OSTEOARTHRITIS OF FIRST CARPOMETACARPAL JOINT OF RIGHT HAND: ICD-10-CM

## 2024-02-07 PROCEDURE — 73130 X-RAY EXAM OF HAND: CPT | Mod: 50

## 2024-02-07 PROCEDURE — 1123F ACP DISCUSS/DSCN MKR DOCD: CPT | Performed by: ORTHOPAEDIC SURGERY

## 2024-02-07 PROCEDURE — 1036F TOBACCO NON-USER: CPT | Performed by: ORTHOPAEDIC SURGERY

## 2024-02-07 PROCEDURE — 1125F AMNT PAIN NOTED PAIN PRSNT: CPT | Performed by: ORTHOPAEDIC SURGERY

## 2024-02-07 PROCEDURE — 73130 X-RAY EXAM OF HAND: CPT | Mod: BILATERAL PROCEDURE | Performed by: RADIOLOGY

## 2024-02-07 PROCEDURE — 99214 OFFICE O/P EST MOD 30 MIN: CPT | Mod: GC | Performed by: ORTHOPAEDIC SURGERY

## 2024-02-07 PROCEDURE — 1157F ADVNC CARE PLAN IN RCRD: CPT | Performed by: ORTHOPAEDIC SURGERY

## 2024-02-07 PROCEDURE — 1159F MED LIST DOCD IN RCRD: CPT | Performed by: ORTHOPAEDIC SURGERY

## 2024-02-07 PROCEDURE — 1160F RVW MEDS BY RX/DR IN RCRD: CPT | Performed by: ORTHOPAEDIC SURGERY

## 2024-02-07 PROCEDURE — 99204 OFFICE O/P NEW MOD 45 MIN: CPT | Performed by: ORTHOPAEDIC SURGERY

## 2024-02-07 NOTE — PROGRESS NOTES
"CHIEF COMPLAINT         Bilateral duypetrens, CMC arthritis    ASSESSMENT + PLAN    Bilateral Dupuytren's nodules of small and ring rays    I reviewed the nature of Dupuytren's disease and the intermittently progressive typical clinical course.  I discussed the multitude of treatment options including simple observation, formal open surgical cord excision, needle release, and Xiaflex enzyme injection along with the major risks, benefits, and durability of each option.    As there is no limitation in range of motion, there is no need for any particular intervention at this point.  I reviewed the \"tabletop test\" and other common functional limitations that should prompt a return to clinic, should they appear.        Bilateral CMC osteoarthritis, Eaton stage III    The nature of basal joint arthritis was reviewed, along with the natural history and expected waxing and waning course.  I reviewed the options for management, including observation, nonsteroidals, activity modification, splinting, oral steroids, cortisone injection, or surgical joint reconstruction, along with the major risks and benefits, and likely success rates of each.     The patient did not want anything invasive at this time, and will continue with activity modification, splints, and nonsteroidals as needed, and followup with any concerns.        HISTORY OF PRESENT ILLNESS       Patient is a 73 y.o. right-hand-dominant retired woman, who presents today at suggestion of Dr. Anthony for evaluation of bilateral hand pain. She is s/p L thumb trigger release several years ago, doing very well without recurrence of symptoms. She noticed painless nodules on both of her palms, referred on to me by her PCP. Also reports bilateral thumb pain, aching in character but becoming worse with pinch or twist tasks with a sharper character. No injections, immobilization, or PT.    She is not diabetic or hypothyroid.  She does not smoke.      REVIEW OF SYSTEMS       A " 30-item multi-system Review Of Systems was obtained on today's intake form.  This was reviewed with the patient and is correct.  The pertinent positives and negatives are listed above.  The form has been scanned separately into the medical record.      PHYSICAL EXAM    Constitutional:    Appears stated age. Well-developed and well-nourished female in no acute distress.  Psychiatric:         Pleasant normal mood and affect. Behavior is appropriate for the situation.   Head:                   Normocephalic and atraumatic.  Eyes:                    Pupils are equal and round.  Cardiovascular:  2+ radial and ulnar pulses. Fingers well-perfused.  Respiratory:        Effort normal. No respiratory distress. Speaking in complete sentences.  Neurologic:       Alert and oriented to person, place, and time.  Skin:                Skin is intact, warm and dry.  Hematologic / Lymphatic:    No lymphedema or lymphangitis.    Extremities / Musculoskeletal:                   RUE:   -Pos CMC grind  -Palpable duypetrens nodule SF and RF at level of distal palmar crease with normal overlying skin.  No blanching with full composite extension.  -Motor intact in axillary/AIN/PIN/ulnar distributions  -SILT axillary/radial/median/ulnar distributions  -Hand wwp, 2+ radial pulse, cap refill brisk    LUE:   -Pos CMC grind  -Scar L thumb   -Palpable duypetrens nodule SF and RF at level of distal palmar crease, no limitation on motion  -Motor intact in axillary/AIN/PIN/ulnar distributions  -SILT axillary/radial/median/ulnar distributions  -Hand wwp, 2+ radial pulse, cap refill brisk    Bilateral moderate CMC shoulder sign.  Tender at CMC, but not STT.  No MP hyperextension collapse.  Good IP flexion extension pull-through with no triggering or de Quervain's signs on either side.  Negative Stark.  Negative midcarpal shift.  Symmetric wrist and forearm motion.  Positive CMC grind, reproducing chief complaint on each side.  Sensation intact to  light touch in all distributions.  Capillary refill less than 2 seconds.        IMAGING / LABS / EMGs    X-rays bilateral hands ordered and independently interpreted by me today show no acute fracture, subluxation, or foreign body.  Bilateral advanced CMC osteoarthritic collapse with sclerosis and large spur formation, Eaton stage III.        Past Medical History:   Diagnosis Date    Encounter for screening for malignant neoplasm of skin 10/18/2016    Skin cancer screening    Impacted cerumen, left ear 03/16/2022    Impacted cerumen of left ear    Impacted cerumen, right ear 03/16/2022    Impacted cerumen of right ear    Personal history of other medical treatment     History of blood transfusion    Unspecified hearing loss, bilateral 10/31/2017    Bilateral hearing loss    Unspecified hearing loss, right ear 03/16/2022    Hearing loss in right ear       Medication Documentation Review Audit       Reviewed by Andra Rodriguez MA (Medical Assistant) on 02/07/24 at 1125      Medication Order Taking? Sig Documenting Provider Last Dose Status   cholecalciferol (Vitamin D-3) 25 MCG (1000 UT) tablet [id number] No Take by mouth. Historical Provider, MD Taking Active   FLUoxetine (PROzac) 20 mg capsule [id number]  Take 1 capsule (20 mg) by mouth once daily. Tracy Carnes MD PhD  Active   gabapentin (Neurontin) 100 mg capsule [id number]  Take 1 capsule (100 mg) by mouth 3 times a day. Tracy Carnes MD PhD  Active   naproxen (Naprosyn) 500 mg tablet [id number] No Take 1 tablet (500 mg) by mouth every 12 hours. Tracy Carnes MD PhD Taking Active                    Allergies   Allergen Reactions    Meperidine Unknown       Social History     Socioeconomic History    Marital status:      Spouse name: Not on file    Number of children: Not on file    Years of education: Not on file    Highest education level: Not on file   Occupational History    Not on file   Tobacco Use    Smoking status: Former     Types:  Cigarettes     Passive exposure: Never    Smokeless tobacco: Never   Vaping Use    Vaping Use: Never used   Substance and Sexual Activity    Alcohol use: Yes     Comment: occasional    Drug use: Never    Sexual activity: Not on file   Other Topics Concern    Not on file   Social History Narrative    Not on file     Social Determinants of Health     Financial Resource Strain: Not on file   Food Insecurity: Not on file   Transportation Needs: Not on file   Physical Activity: Not on file   Stress: Not on file   Social Connections: Not on file   Intimate Partner Violence: Not on file   Housing Stability: Not on file       Past Surgical History:   Procedure Laterality Date    APPENDECTOMY  02/17/2014    Appendectomy    OTHER SURGICAL HISTORY  02/17/2014    Jaw Fracture Treatment Simple Mandible - Closed Reduction    OTHER SURGICAL HISTORY  01/20/2015    Endovenous Ablation Of Incompetent Vein Radiofrequency     Santo Saez MD  Orthopaedic Surgery PGY-2    ATTESTATION    I saw and evaluated the patient. I personally obtained the key and critical portions of the history and physical exam or was physically present for key and critical portions performed by the resident/fellow. I reviewed the resident/fellow's documentation and discussed the patient with the resident/fellow. I agree with the resident/fellow's medical decision making as documented in the note.        Electronically signed  LORA Pelaez MD  [phone]

## 2024-02-07 NOTE — LETTER
"February 28, 2024     Tracy Carnes MD PhD  [address]  [facility]  [facility] [address]    Patient: Zaki Terry   YOB: 1950   Date of Visit: 2/7/2024       Dear Dr. Tracy Carnes MD PhD:    Thank you for referring Zaki Terry to me for evaluation. Below are my notes for this consultation.  If you have questions, please do not hesitate to call me. I look forward to following your patient along with you.       Sincerely,     Shakir Pelaez MD      CC: No Recipients  ______________________________________________________________________________________    CHIEF COMPLAINT         Bilateral duypetrens, CMC arthritis    ASSESSMENT + PLAN    Bilateral Dupuytren's nodules of small and ring rays    I reviewed the nature of Dupuytren's disease and the intermittently progressive typical clinical course.  I discussed the multitude of treatment options including simple observation, formal open surgical cord excision, needle release, and Xiaflex enzyme injection along with the major risks, benefits, and durability of each option.    As there is no limitation in range of motion, there is no need for any particular intervention at this point.  I reviewed the \"tabletop test\" and other common functional limitations that should prompt a return to clinic, should they appear.        Bilateral CMC osteoarthritis, Eaton stage III    The nature of basal joint arthritis was reviewed, along with the natural history and expected waxing and waning course.  I reviewed the options for management, including observation, nonsteroidals, activity modification, splinting, oral steroids, cortisone injection, or surgical joint reconstruction, along with the major risks and benefits, and likely success rates of each.     The patient did not want anything invasive at this time, and will continue with activity modification, splints, and nonsteroidals as needed, and followup with any " concerns.        HISTORY OF PRESENT ILLNESS       Patient is a 73 y.o. right-hand-dominant retired woman, who presents today at suggestion of Dr. Anthony for evaluation of bilateral hand pain. She is s/p L thumb trigger release several years ago, doing very well without recurrence of symptoms. She noticed painless nodules on both of her palms, referred on to me by her PCP. Also reports bilateral thumb pain, aching in character but becoming worse with pinch or twist tasks with a sharper character. No injections, immobilization, or PT.    She is not diabetic or hypothyroid.  She does not smoke.      REVIEW OF SYSTEMS       A 30-item multi-system Review Of Systems was obtained on today's intake form.  This was reviewed with the patient and is correct.  The pertinent positives and negatives are listed above.  The form has been scanned separately into the medical record.      PHYSICAL EXAM    Constitutional:    Appears stated age. Well-developed and well-nourished female in no acute distress.  Psychiatric:         Pleasant normal mood and affect. Behavior is appropriate for the situation.   Head:                   Normocephalic and atraumatic.  Eyes:                    Pupils are equal and round.  Cardiovascular:  2+ radial and ulnar pulses. Fingers well-perfused.  Respiratory:        Effort normal. No respiratory distress. Speaking in complete sentences.  Neurologic:       Alert and oriented to person, place, and time.  Skin:                Skin is intact, warm and dry.  Hematologic / Lymphatic:    No lymphedema or lymphangitis.    Extremities / Musculoskeletal:                   RUE:   -Pos CMC grind  -Palpable duypetrens nodule SF and RF at level of distal palmar crease with normal overlying skin.  No blanching with full composite extension.  -Motor intact in axillary/AIN/PIN/ulnar distributions  -SILT axillary/radial/median/ulnar distributions  -Hand wwp, 2+ radial pulse, cap refill brisk    LUE:   -Pos CMC  grind  -Scar L thumb   -Palpable duypetrens nodule SF and RF at level of distal palmar crease, no limitation on motion  -Motor intact in axillary/AIN/PIN/ulnar distributions  -SILT axillary/radial/median/ulnar distributions  -Hand wwp, 2+ radial pulse, cap refill brisk    Bilateral moderate CMC shoulder sign.  Tender at CMC, but not STT.  No MP hyperextension collapse.  Good IP flexion extension pull-through with no triggering or de Quervain's signs on either side.  Negative Stark.  Negative midcarpal shift.  Symmetric wrist and forearm motion.  Positive CMC grind, reproducing chief complaint on each side.  Sensation intact to light touch in all distributions.  Capillary refill less than 2 seconds.        IMAGING / LABS / EMGs    X-rays bilateral hands ordered and independently interpreted by me today show no acute fracture, subluxation, or foreign body.  Bilateral advanced CMC osteoarthritic collapse with sclerosis and large spur formation, Eaton stage III.        Past Medical History:   Diagnosis Date   • Encounter for screening for malignant neoplasm of skin 10/18/2016    Skin cancer screening   • Impacted cerumen, left ear 03/16/2022    Impacted cerumen of left ear   • Impacted cerumen, right ear 03/16/2022    Impacted cerumen of right ear   • Personal history of other medical treatment     History of blood transfusion   • Unspecified hearing loss, bilateral 10/31/2017    Bilateral hearing loss   • Unspecified hearing loss, right ear 03/16/2022    Hearing loss in right ear       Medication Documentation Review Audit       Reviewed by Andra Rodriguez MA (Medical Assistant) on 02/07/24 at 1125      Medication Order Taking? Sig Documenting Provider Last Dose Status   cholecalciferol (Vitamin D-3) 25 MCG (1000 UT) tablet [id number] No Take by mouth. Historical Provider, MD Taking Active   FLUoxetine (PROzac) 20 mg capsule [id number]  Take 1 capsule (20 mg) by mouth once daily. Tracy Carnes MD PhD  Active    gabapentin (Neurontin) 100 mg capsule [id number]  Take 1 capsule (100 mg) by mouth 3 times a day. Tracy Carnes MD PhD  Active   naproxen (Naprosyn) 500 mg tablet [id number] No Take 1 tablet (500 mg) by mouth every 12 hours. Tracy Carnes MD PhD Taking Active                    Allergies   Allergen Reactions   • Meperidine Unknown       Social History     Socioeconomic History   • Marital status:      Spouse name: Not on file   • Number of children: Not on file   • Years of education: Not on file   • Highest education level: Not on file   Occupational History   • Not on file   Tobacco Use   • Smoking status: Former     Types: Cigarettes     Passive exposure: Never   • Smokeless tobacco: Never   Vaping Use   • Vaping Use: Never used   Substance and Sexual Activity   • Alcohol use: Yes     Comment: occasional   • Drug use: Never   • Sexual activity: Not on file   Other Topics Concern   • Not on file   Social History Narrative   • Not on file     Social Determinants of Health     Financial Resource Strain: Not on file   Food Insecurity: Not on file   Transportation Needs: Not on file   Physical Activity: Not on file   Stress: Not on file   Social Connections: Not on file   Intimate Partner Violence: Not on file   Housing Stability: Not on file       Past Surgical History:   Procedure Laterality Date   • APPENDECTOMY  02/17/2014    Appendectomy   • OTHER SURGICAL HISTORY  02/17/2014    Jaw Fracture Treatment Simple Mandible - Closed Reduction   • OTHER SURGICAL HISTORY  01/20/2015    Endovenous Ablation Of Incompetent Vein Radiofrequency     Santo Saez MD  Orthopaedic Surgery PGY-2    ATTESTATION    I saw and evaluated the patient. I personally obtained the key and critical portions of the history and physical exam or was physically present for key and critical portions performed by the resident/fellow. I reviewed the resident/fellow's documentation and discussed the patient with the  resident/fellow. I agree with the resident/fellow's medical decision making as documented in the note.        Electronically signed  LORA Pelaez MD  [phone]

## 2024-03-04 ENCOUNTER — HOSPITAL ENCOUNTER (OUTPATIENT)
Dept: RADIOLOGY | Facility: CLINIC | Age: 74
Discharge: HOME | End: 2024-03-04
Payer: MEDICARE

## 2024-03-04 ENCOUNTER — APPOINTMENT (OUTPATIENT)
Dept: RADIOLOGY | Facility: CLINIC | Age: 74
End: 2024-03-04
Payer: MEDICARE

## 2024-03-04 DIAGNOSIS — Z12.31 ENCOUNTER FOR SCREENING MAMMOGRAM FOR BREAST CANCER: ICD-10-CM

## 2024-03-04 PROCEDURE — 77067 SCR MAMMO BI INCL CAD: CPT

## 2024-03-04 PROCEDURE — 77063 BREAST TOMOSYNTHESIS BI: CPT | Performed by: RADIOLOGY

## 2024-03-04 PROCEDURE — 77067 SCR MAMMO BI INCL CAD: CPT | Performed by: RADIOLOGY

## 2024-03-12 ENCOUNTER — OFFICE VISIT (OUTPATIENT)
Dept: PSYCHOLOGY | Facility: CLINIC | Age: 74
End: 2024-03-12
Payer: MEDICARE

## 2024-03-12 DIAGNOSIS — R41.3 MEMORY DIFFICULTY: Primary | ICD-10-CM

## 2024-03-12 DIAGNOSIS — F41.9 ANXIETY: ICD-10-CM

## 2024-03-12 DIAGNOSIS — R41.9 COGNITIVE COMPLAINTS: ICD-10-CM

## 2024-03-12 DIAGNOSIS — F43.0 STRESS REACTION: ICD-10-CM

## 2024-03-12 DIAGNOSIS — F32.A DEPRESSION, UNSPECIFIED DEPRESSION TYPE: ICD-10-CM

## 2024-03-12 PROCEDURE — 99211NT NEUROPYSCH TESTING PENDING FINAL BILLING: Performed by: PSYCHOLOGIST

## 2024-03-12 PROCEDURE — 1123F ACP DISCUSS/DSCN MKR DOCD: CPT | Performed by: PSYCHOLOGIST

## 2024-03-12 PROCEDURE — 1159F MED LIST DOCD IN RCRD: CPT | Performed by: PSYCHOLOGIST

## 2024-03-12 PROCEDURE — 1157F ADVNC CARE PLAN IN RCRD: CPT | Performed by: PSYCHOLOGIST

## 2024-03-12 PROCEDURE — 1160F RVW MEDS BY RX/DR IN RCRD: CPT | Performed by: PSYCHOLOGIST

## 2024-03-12 NOTE — PROGRESS NOTES
Neuropsychology Evaluation    Name: Zaki Terry  : 1950  MRN: [id number]  Referring Provider: Grant Pena MD    Date of Service: 2024      Reason for Visit: Ms. Terry was referred for neuropsychological evaluation due to concerns of cognitive decline involving increased difficulty with aspects of expressive language (i.e., word finding, finishing sentences).    Summary/Impressions: In the context of presumed high-average premorbid intellectual functioning, present results demonstrated intact cognitive abilities in all domains tested (i.e., simple auditory attention, working memory, processing speed, visuospatial abilities, language, learning and memory, executive functioning), with no impairments and several above-average performances. In this context, isolated weakness on a measure involving speeded word reading/color naming most likely reflects normal fluctuation/intraindividual variability in performance across a comprehensive test battery and/or interference from non-neurological factors (e.g., stress, anxiety, fatigue) on cognitive effectiveness/test performance. Fine motor speed was average, without significant lateralization. Emotionally, Ms. Terry reported mild depression and minimal anxiety.    DIAGNOSTIC IMPRESSIONS: History and present results are not indicative of a cognitive disorder or cognitive impairment. Ms. Terry's reported difficulties with language were not apparent on testing or clinical evaluation, and the cognitive profile was quite strong overall. At present, there is no compelling evidence of a neurodegenerative process or focal/lateralizing signs that would implicate a focal neurological condition (e.g., stroke, tumor, epilepsy).     In her daily life, stress, depression/fluctuating mood, anxiety, and fatigue are likely contributing to her experience of cognitive difficulty. Even minimally, these factors may intermittently reduce cognitive effectiveness,  typically by depleting cognitive/mental resources and interfering with normal attention and efficiency of information processing. With attention disturbance, individuals may experience difficulty controlling their thoughts (e.g., and be more prone to losing their train of thought). Stress has also been shown to impair the retrieval of stored information (e.g., words). In this context, Ms. Terry's description of cognitive difficulties in daily life can be attributed to lapses in attention and diminished cognitive effectiveness. Psychological/situational factors can also compromise other abilities (e.g., performing complex detail-oriented tasks, attending to multiple tasks at once, acquiring new information, and organizing thoughts/speech) and hinder the ability to adapt to, or develop compensatory strategies for, any subtle cognitive weaknesses/changes that may be present.      RECOMMENDATIONS & CONSIDERATIONS:    1.   Ms. Terry can be assured that her cognitive functioning is normal (and, in many instances, above average) compared to same-aged peers. Importantly, learning/memory performances were consistently within normal limits, indicating that she does not currently have a fundamental memory retention problem. Rather, stress, worry/anxiety, depression/fluctuating mood, and daytime fatigue are likely interfering with the ability to effectively encode and retrieve information in her day-to-day life.      Nevertheless, routine monitoring and follow-up care with Dr. Pena is recommended.    2.   Ms. Terry's report of depression and anxiety (in the context of recent life stress) suggests that psychological symptoms are not optimally managed at present; targeting these factors would be prudent.    Ms. Terry may benefit from individual psychotherapy / adjustment counseling, particularly brief structured cognitive behavioral therapy (CBT) or acceptance and commitment therapy (ACT), to learn effective coping  skills and strategies for managing stress and mood symptoms that interfere with her daily functioning.    Ms. Terry might also benefit from consultation with a psychiatrist familiar with the neurocognitive consequences of stress/depression/anxiety. Dr. Margarita Arellano, Dr. Harriett Matson, Dr. Adiel He, Dr. Karma Chu, or one of their colleagues in Psychiatry ([phone]) can assist with optimizing pharmacological management of emotional factors that might be exacerbating cognitive difficulties and/or otherwise interfering with everyday functioning. [place], Dr. Valdemar Morris (/[facility] [phone]) and Dr. Andrew Johnson MD ([phone]) are both highly recommended.     3.    Restorative sleep (7-9 hours/night recommended for adults) is critical for daytime alertness/safety, cognitive effectiveness, and mood, as well as physical, neurological, and mental health more broadly. Though Ms. Terry reported ample sleep (recently increased), she described a degree of sleep disturbance and daytime fatigue, which may be related, in part, to psychological factors. If difficulties persist or worsen, she might benefit from consultation with a sleep specialist and/or formal sleep study to investigate and address potential variables that may be interfering with sleep.    In general, the following behavioral strategies and environmental modifications can help optimize sleep duration and quality:            a.  Set a fixed bedtime and waking time every day.            b.  Avoid napping during the day.             c.  Avoid alcohol, caffeine and heavy, spicy, or sugary foods 4-6 hours before bedtime.             d.  Exercise regularly, but not right before going to bed.            e.  Block out all distractions by turning off - or even removing from the room - electronic devices such as TV, computer, phone, video player, audio player, paco device, etc.            f.  Use  comfortable bedding, set a comfortable temperature, and keep the room well ventilated.            g.  Do not use the bed as an office, workroom, or recreation room.             h.  Establish a pre-sleep ritual, such as a warm bath or a few minutes of reading.            i.  If prone to anxiety, relaxation techniques such as yoga and deep breathing can be helpful.     4.   Leading a physically, socially, and cognitively active lifestyle is important for healthy brain aging. Within the bounds of safety, good judgment, and medical advice, this includes engaging in regular exercise (e.g., walking, swimming, yoga). As much as possible, given health/safety considerations, Ms. Terry is also encouraged to maintain a socially active lifestyle, as a means of promoting cognitive stimulation and emotional benefits that will optimize quality of life. She should continue engaging in activities she enjoys, as well as exploring and pursuing new meaningful hobbies.     The following activities and interventions are recommended for optimizing brain health and preserving cognitive function:            a.  “heart-healthy”/cardiovascular diet;            b.  good stress management (e.g., relaxation techniques);            c.  management of any vascular risks (e.g., hypertension, cholesterol);            d.  30-60 minutes of daily aerobic exercise (e.g., walking, swimming);            e.  social engagement (e.g., getting together with other people);             f.  adequate sleep; and             g.  cognitively stimulating activities (e.g., classes to learn new things, challenging puzzles).      5.   A planned re-evaluation does not appear necessary at this time. If concerns persist or worsen after psychological factors are better managed, repeat neuropsychological assessment (as clinically indicated; no sooner than 1-2 years), using present data as baseline, may be helpful in determining if there is cognitive change over time.  "      Thank you for the opportunity to participate in Ms. Terry's evaluation and care. If I can be of further assistance, please do not hesitate to contact me at [phone].      -- EXTENDED REPORT --      History of Presenting Illness: Ms. Terry is a 73 y.o., right-handed,  female, who was seen by Dr. Pena on 11/20/2023 for neurological evaluation of cognitive changes (i.e., increased difficulty with aspects of expressive language, such as word finding and finishing sentences); MMSE was 29/30 (-1 recall).     RELEVANT LABS/EXAMS: Head CT (w/o IV contrast) from 10/30/2023 was interpreted by the radiologist as showing \"mild brain parenchymal volume loss,\" with \"mild asymmetry in the size of the temporal horns of the lateral ventricles with the right temporal horn noted be slightly larger when compared with the left which may fall within the range of normal variation although mild asymmetric more pronounced surrounding brain parenchymal volume loss could give a similar appearance and can not be excluded.\" There were also \"mild nonspecific white matter changes within the cerebral hemispheres bilaterally which while nonspecific, given the patient's age, likely represent sequelae of small-vessel ischemic change.\"    CLINICAL INTERVIEW: Ms. Terry reported progressive cognitive difficulty over the past 4-5 months, primarily involving conversational ability. Her  and children have mentioned that she tends to leave sentences unfinished; she is uncertain what typically causes this behavior but suggested that she may lose her train of thought and trail off or inadvertently move on to a different thought/topic. She also reported difficulty with word finding (\"don't remember words for things\") and organizing thoughts/speech (\"don't know what I'm trying to say\"). In response to more direct prompts, she additionally acknowledged increased difficulty with name retrieval, attention/concentration, and " "distractibility, as well as slower speed of information processing. Subjective cognitive status seems to fluctuate from day to day, with no identifiable pattern.    Relevant Medical Status & History:    - Sleep: Increased over the past year, averaging ~8-9 hours/night. Typically in bed around 11:30PM, without significant difficulty falling asleep after reading for 20-30 minutes. Ms. Terry may wake during the night, with occasional difficulty returning to sleep (if \"things on my mind\"). She does not snore but occasionally acts out dreams (e.g., swimming, screaming), which is reportedly longstanding/unchanged. She generally feels well-rested in the morning (typically rises around 8AM) but acknowledged some daytime fatigue, with tendency to doze after lunch. She has not undergone formal sleep study.   - Appetite: “Too good”; weight has been relatively stable on a normal diet.    - Physical activity/exercise: Swim class/water aerobics (2x/week).    Ms. Terry denied any notable balance/gait disturbance, recent falls, involuntary motor movement (e.g., tremors) or chronic pain. She acknowledged increased urinary urgency over the past 2-3 years, with occasional incontinence; prescription medication was reportedly discontinued due to unwanted side effects. Hearing was described as \"pretty good\"; previous records indicate bilateral hearing loss (10/31/2017), though most recent audiological exam (2/2/2021) appears largely normal. She denied any known history of concussion/head injury, seizure, or stroke.     Patient Active Problem List   Diagnosis    Acquired hammertoe of left foot    Acute streptococcal pharyngitis    Acute UTI    Urinary tract infection    Bilateral impacted cerumen    Depression    Dermatitis due to plants, including poison ivy, sumac, and oak    External hemorrhoids    Internal hemorrhoids    Fatigue    Finger laceration    Hallux valgus, left    Lumbar radiculitis    Left shoulder pain    " Inconclusive mammogram    Hyperlipidemia    Hearing problem of both ears    Lumbar spondylosis    Major depressive disorder, recurrent, moderate (CMS/HCC)    Osteopenia    Osteoarthritis of hip    Poison ivy dermatitis    Premature ventricular contraction on electrocardiogram    Rectal bleeding    Rib contusion    Sciatica    Shortness of breath    SI (sacroiliac) joint dysfunction    Dry skin    Skin lesions    UTI symptoms    Sore throat    Thrush    Tinnitus of right ear    TMJ arthropathy    Tubular adenoma of colon    URI, acute    Urinary incontinence    Varicose veins of leg with pain    Vitamin D deficiency    Wheeze    Memory changes    Routine general medical examination at health care facility    Deformity of hand     Current Outpatient Medications:     cholecalciferol (Vitamin D-3) 25 MCG (1000 UT) tablet, Take by mouth    FLUoxetine (PROzac) 20 mg capsule, Take 1 capsule (20 mg) by mouth once daily    gabapentin (Neurontin) 100 mg capsule, Take 1 capsule (100 mg) by mouth 3 times a day    naproxen (Naprosyn) 500 mg tablet, Take 1 tablet (500 mg) by mouth every 12 hours [PRN]    Substance Use History:    - Alcohol use: 1 mixed drink/day (e.g., silva, [place] iced tea); no reported history of particularly heavy or problematic alcohol use.   - Tobacco: History of cigarette smoking (estimated 1 pack/day) from 1966 to 1981.   - Recreational drugs: Denied past and present use.   - Caffeine: 2 cups of coffee in the AM.    Psychiatric Status & History: Ms. Terry reported a lifelong history of worry/anxiety (which she realized in the 1980s, after presenting to the hospital with what she thought was heart problem), with onset of depression in adulthood (particularly after senior care; attributed, in part, to lack of validation). She denied any history of suicidal/homicidal ideation or auditory/visual hallucinations.   - Treatment history: Not currently followed by Psychiatry (Ms. Terry has not seen a  "psychiatrist since before [facility] in 2014); psychiatric medications are managed by her PCP.    - Recent mood:  \"Pretty good.” She denied any particularly notable feelings of depression but described diminished motivation and increased difficulty with decision making. Fluoxetine helps manage anxiety.    - Recent stressors: Mother passed away rather unexpectedly on 2/17/2024, after being hospitalized for UTI (patient had been her primary caregiver for ~ 7 years); concern regarding 's memory, as he is becoming more forgetful.    - Typical daily activities: Reading; sudoku puzzles; attempting to organize her mother's belongings/affairs; caregiver support group; accompanying granddaughter to gymnastics on Thursdays.   - Social support: Ms. Terry identified friends, her Spiritism community, caregiver support group members, and individuals from her water aerobics class; however, she indicated that she does not typically reach out to others for emotional support.    Developmental/Educational/Psychosocial History:     - Early development: Reportedly normal; no known birth complications or developmental delays.   - Academic history: No history of attention difficulties, learning or behavioral problems, special services/academic accommodations, or repeated/skipped grades. Ms. 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    - Educational attainment: Bachelor's degree in mathematics from [employer].    - Employment history: Computer programming; [employer] (accounting, estate/trust tax).   - Social history:  in 1975, with 3 adult children (ages 38, 40, and 42) and 4 grandchildren.   - Currently lives with:  of 49 years.    Relevant Family History: No known family history of neurological conditions or cognitive decline/impairment with aging.    - Mother: \"Nervous breakdown\" (treated with ECT).   - Sister: Depression/anxiety.   - Maternal grandfather: PTSD and possible other unknown " psychiatric condition.   - Maternal great-grandmother: Unknown psychiatric condition.     Procedures/Tests:  Review of available records; Adult Neuropsychology History Form; Clinical interview with Ms. Terry;  Lugo Anxiety Inventory (KAMAR)  Lugo Depression Inventory (BDI-2)  Waverly Naming Test-Second Edition (BNT-2)  Brief Visuospatial Memory Test-Revised (BVMT-R) - Form 1   Arcelia-Mccormack Executive Function System (D-KEFS) Verbal Fluency  Dot Counting Test (DCT)  Executive Clock Drawing Task (CLOX)  Finger Tapping Test  Sweeney Verbal Learning Test-Revised (HVLT-R) - Form 1   Modified Wisconsin Card Sorting Test (MWCST)  Kevin Complex Figure Test (RCFT) - Copy Trial   Stroop Color and Word Test (Stroop)  Trail Making Test (TMT)  Wechsler Adult Intelligence Scale-Fourth Edition (WAIS-IV) Digit Span and Coding subtests  Wechsler Memory Scale-Fourth Edition (WMS-IV) Logical Memory  Wechsler Test of Adult Reading (WTAR)    The purpose of this evaluation was reviewed, as were procedural details, issues related to confidentiality, and options for receiving feedback regarding results. All questions were answered; Ms. Terry verbalized understanding and agreed to proceed with this evaluation.    Note: All testing was administered by a psychometrist; results were interpreted in the context of the appropriate normative data.     Behavioral Observations/Neurobehavioral Status Exam: Ms. Terry arrived for the appointment on time and unaccompanied. She was casually dressed and appropriately groomed. Gait was steady/largely unremarkable. There were no apparent involuntary motor movements, though she was prone to fidgeting with her hands. She was alert and fully oriented to self, situation, place, and time. She wore glasses; vision was reportedly adequate for testing. Hearing and receptive language appeared largely intact on informal observation. Conversational speech was fluent and normal in rate, prosody, and volume, with no  apparent language abnormalities; there were only 1-2 instances of momentary word-finding difficulty (involving somewhat uncommon terms), well within normal limits for a typical adult conversation (not to mention an extended clinical interview). Ms. Terry was open and responsive to questions, with no apparent difficulty providing details of autobiographical and clinical history. Expressed thoughts were logical and goal-directed. She demonstrated good insight into cognitive and functional abilities. Affect was somewhat dysphoric, with occasional appropriate brightening; there was a degree of anxiety apparent, particularly during formal testing. Overall, she was cooperative in completing evaluation procedures and appeared to give her best effort on tasks.     Results/Data:       Descriptor T-Score Standard Score Z-Score Scaled Score %ile Rank   Very Superior > 70 > 130 > 2.00  > 16 > 98   Superior 63-69 120-129 1.34 to 1.99 14-15 91-97   High Average 57-62 110-119 0.67 to 1.33 12-13 75-90   Average 43-56  -0.66 to 0.66 8-11 25-74   Low Average 37-42 80-89 -1.33 to -0.67 6-7 9-24   Borderline 33-36 75-79 -1.67 to -1.34 5 5-8   Mildly Deficient 30-32 70-74 -2.00 to -1.68 4 2-4   Moderately Deficient 25-29 62-69  -2.53 to -2.01 3 1   Severely Deficient <24 <61 <-2.54 1-2 <1      Performance Validity: Results of stand-alone and embedded measures of performance validity were within valid ranges.     Premorbid Functioning: Based on a measure of sight-reading ability, premorbid intellectual functioning was estimated to be in the high-average range (estimated FSIQ = 118), commensurate with Ms. Terry's educational history.    Attention & Working Memory: Span of auditory attention/working memory, measured by repetition of digits, was in the superior range overall, with superior performance for digits forward, backward, and in sequence.    Processing Speed & Executive Abilities: Efficiency in matching and reproducing  symbols associated with numbers was in the superior range. Speeded word reading was in the borderline range, and color naming was average; color naming of incongruent color words (e.g., blue ink spelling the word “red”) was low average, with overall performance pattern suggesting low-average selective attention.     Performance on a timed task that requires simultaneous visual scanning, number sequencing, and fine-motor coordination was high average, with 0 errors. When the task became more complex, requiring alternating attention to sequencing numbers and letters, performance was in the very superior range, with 0 errors. A comparison of scores on the two tasks did not suggest a problem with shifting of mental set.    On a concept-formation and problem-solving task that involves matching cards by shared fundamental attributes and using hypothesis testing (i.e., trial and error) to inform responses, overall performance was high average. Ms. Terry quickly identified successful response patterns and effectively utilized performance feedback to guide/adapt her approach, ultimately completing all 6 target solutions with only 2 errors total.    Learning & Memory: Immediate recall of logically organized semantic information in the form of two stories was high average. After a 20-minute delay, recall was high average, without needing semantic reminder cues to help prompt recall. Delayed recognition of story detail was above average.     Learning and recall of a 12-item word list over three trials was high average, with significant benefit from repetition (trial scores: 6, 9, 12). After a 20-minute delay, recall was average, with 10 list words recalled. Ms. Teryr accurately recognized all of the words, with 0 false-positive errors, indicating high-average recognition discriminability.    Regarding visuospatial memory, Ms. Terry's ability to learn and recall an array of six geometric designs over three trials was in  the average range, with a strong/high-average learning curve. Later recall was average (100% of best learning trial), and recognition of the designs was without error.     Language Functioning: Naming of common objects was average (54/60), with additional benefit from phonemic cues (6/6). Letter fluency (i.e., rapid generation of words beginning with a given letter over three one-minute trials) and category fluency (i.e., rapid generation of words in a specific category) wereaverage. Category fluency while switching back and forth between two categories was average. Across verbal fluency tasks, there were fewer than average set-loss errors (0) and repetitions (1).     Visuospatial Functioning: Ability to copy an array of two-dimensional geometric designs was intact. Spontaneous generation of a clock drawing was average. Copy of a clock drawing was high average. Ms. Terry was observed to employ a piecemeal and inefficient/fragmented approach to copying a complex figure; despite this, construction accuracy was high average.       Motor Functioning: Fine motor speed, as measured by finger tapping, was average bilaterally.    Emotional Functioning: On face-valid self-report inventories assessing mood and emotional status, Ms. Terry's responses suggested mild depression and minimal anxiety.       Time-based service: 35929 (46 minutes); 20900 (60 minutes); 64056 (126 minutes); 20068 (30 minutes); 44090 (173 minutes)

## 2024-03-13 ENCOUNTER — HOSPITAL ENCOUNTER (OUTPATIENT)
Dept: RADIOLOGY | Facility: CLINIC | Age: 74
Discharge: HOME | End: 2024-03-13
Payer: MEDICARE

## 2024-03-13 DIAGNOSIS — Z78.0 ASYMPTOMATIC MENOPAUSAL STATE: ICD-10-CM

## 2024-03-13 PROCEDURE — 77080 DXA BONE DENSITY AXIAL: CPT

## 2024-03-18 NOTE — RESULT ENCOUNTER NOTE
Your bone density scan showed osteopenia. Please maintain enough calcium in your diet:  1607-0691 mg daily (consume foods rich in calcium rather than taking only calcium supplement to meet daily calcium requirements), take daily Vitamin D supplement with meal. Average Vit D dose is 2000 international units daily.  Weight bearing exercise routine is recommended. We will discuss details during your next office visit.  Dr. Anthony

## 2024-03-27 ENCOUNTER — OFFICE VISIT (OUTPATIENT)
Dept: PSYCHOLOGY | Facility: CLINIC | Age: 74
End: 2024-03-27
Payer: MEDICARE

## 2024-03-27 DIAGNOSIS — F43.0 STRESS REACTION: ICD-10-CM

## 2024-03-27 DIAGNOSIS — R41.3 MEMORY DIFFICULTY: Primary | ICD-10-CM

## 2024-03-27 DIAGNOSIS — F32.A DEPRESSION, UNSPECIFIED DEPRESSION TYPE: ICD-10-CM

## 2024-03-27 DIAGNOSIS — F41.9 ANXIETY: ICD-10-CM

## 2024-03-27 DIAGNOSIS — R41.9 COGNITIVE COMPLAINTS WITH NORMAL NEUROPSYCHOLOGICAL EXAM: ICD-10-CM

## 2024-03-27 PROCEDURE — 96132 NRPSYC TST EVAL PHYS/QHP 1ST: CPT | Performed by: PSYCHOLOGIST

## 2024-03-27 PROCEDURE — 1123F ACP DISCUSS/DSCN MKR DOCD: CPT | Performed by: PSYCHOLOGIST

## 2024-03-27 PROCEDURE — 1159F MED LIST DOCD IN RCRD: CPT | Performed by: PSYCHOLOGIST

## 2024-03-27 PROCEDURE — 96133 NRPSYC TST EVAL PHYS/QHP EA: CPT | Performed by: PSYCHOLOGIST

## 2024-03-27 PROCEDURE — 96138 PSYCL/NRPSYC TECH 1ST: CPT | Performed by: PSYCHOLOGIST

## 2024-03-27 PROCEDURE — 1160F RVW MEDS BY RX/DR IN RCRD: CPT | Performed by: PSYCHOLOGIST

## 2024-03-27 PROCEDURE — 1157F ADVNC CARE PLAN IN RCRD: CPT | Performed by: PSYCHOLOGIST

## 2024-03-27 PROCEDURE — 96116 NUBHVL XM PHYS/QHP 1ST HR: CPT | Performed by: PSYCHOLOGIST

## 2024-03-27 PROCEDURE — 96139 PSYCL/NRPSYC TST TECH EA: CPT | Performed by: PSYCHOLOGIST

## 2024-03-27 NOTE — PROGRESS NOTES
Neuropsychology Note    Name: Zaki Terry  : 1950  MRN: [id number]      Date of Service: 2024     Reason For Visit: Ms. Terry underwent neuropsychological evaluation on 3/12/2024, due to concerns of cognitive decline. The full report can be found in the note for that visit.     She returned today for feedback regarding evaluation results.    Summary: Ms. Terry was seen today to discuss findings from her neuropsychological evaluation on 3/12/2024; she was unaccompanied.    Results were reviewed and explained, with a thorough discussion of clinical impressions. It was explained that findings do not indicate cognitive impairment, and the overall neuropsychological profile (i.e., with isolated weakness on a measure involving speeded word reading/color naming, in the context of otherwise average/above-average performances) is most consistent with sporadic interference from non-neurological factors (e.g., stress, anxiety, fatigue) on cognitive effectiveness/test performance, or even normal fluctuation/intraindividual variability in performance across a comprehensive test battery, rather than organic neurological dysfunction.     Psychoeducation was provided regarding the effect of stress, anxiety, depression, and fatigue on cognitive effectiveness. Recommendations were discussed, and questions were addressed.    Ms. Terry was appropriately engaged in the appointment; affect was largely dysphoric and anxious, with limited brightening. She verbalized understanding of findings, impressions, and recommendations.          Time-based service:    - Evaluation: 36228 (46 minutes); 17443 (60 minutes); 63860 (126 minutes); 92076 (30 minutes); 24993 (173 minutes)   - Feedback: 33469 (53 minutes)

## 2024-07-17 ENCOUNTER — APPOINTMENT (OUTPATIENT)
Dept: PRIMARY CARE | Facility: CLINIC | Age: 74
End: 2024-07-17
Payer: MEDICARE

## 2024-07-17 VITALS
BODY MASS INDEX: 26.54 KG/M2 | WEIGHT: 149.8 LBS | SYSTOLIC BLOOD PRESSURE: 122 MMHG | RESPIRATION RATE: 16 BRPM | OXYGEN SATURATION: 98 % | HEIGHT: 63 IN | TEMPERATURE: 98 F | HEART RATE: 60 BPM | DIASTOLIC BLOOD PRESSURE: 60 MMHG

## 2024-07-17 DIAGNOSIS — E78.2 MIXED HYPERLIPIDEMIA: ICD-10-CM

## 2024-07-17 DIAGNOSIS — F33.1 MAJOR DEPRESSIVE DISORDER, RECURRENT, MODERATE (MULTI): ICD-10-CM

## 2024-07-17 DIAGNOSIS — N39.41 URGE INCONTINENCE OF URINE: ICD-10-CM

## 2024-07-17 DIAGNOSIS — E55.9 VITAMIN D DEFICIENCY: ICD-10-CM

## 2024-07-17 DIAGNOSIS — R35.0 INCREASED URINARY FREQUENCY: Primary | ICD-10-CM

## 2024-07-17 DIAGNOSIS — M54.16 LUMBAR RADICULITIS: ICD-10-CM

## 2024-07-17 DIAGNOSIS — M85.89 OSTEOPENIA OF MULTIPLE SITES: ICD-10-CM

## 2024-07-17 PROCEDURE — 1159F MED LIST DOCD IN RCRD: CPT | Performed by: INTERNAL MEDICINE

## 2024-07-17 PROCEDURE — 1160F RVW MEDS BY RX/DR IN RCRD: CPT | Performed by: INTERNAL MEDICINE

## 2024-07-17 PROCEDURE — 1036F TOBACCO NON-USER: CPT | Performed by: INTERNAL MEDICINE

## 2024-07-17 PROCEDURE — 1123F ACP DISCUSS/DSCN MKR DOCD: CPT | Performed by: INTERNAL MEDICINE

## 2024-07-17 PROCEDURE — G2211 COMPLEX E/M VISIT ADD ON: HCPCS | Performed by: INTERNAL MEDICINE

## 2024-07-17 PROCEDURE — 99214 OFFICE O/P EST MOD 30 MIN: CPT | Performed by: INTERNAL MEDICINE

## 2024-07-17 PROCEDURE — 1157F ADVNC CARE PLAN IN RCRD: CPT | Performed by: INTERNAL MEDICINE

## 2024-07-17 RX ORDER — GABAPENTIN 100 MG/1
100 CAPSULE ORAL 3 TIMES DAILY
Qty: 270 CAPSULE | Refills: 0 | Status: SHIPPED | OUTPATIENT
Start: 2024-07-17

## 2024-07-17 ASSESSMENT — ENCOUNTER SYMPTOMS
AGITATION: 0
POLYPHAGIA: 0
SORE THROAT: 0
NERVOUS/ANXIOUS: 0
DYSURIA: 0
EYE REDNESS: 0
NUMBNESS: 0
UNEXPECTED WEIGHT CHANGE: 0
SINUS PRESSURE: 0
VOICE CHANGE: 0
LIGHT-HEADEDNESS: 0
CONSTITUTIONAL NEGATIVE: 1
SPEECH DIFFICULTY: 0
NEUROLOGICAL NEGATIVE: 1
VOMITING: 0
WOUND: 0
CONSTIPATION: 0
BACK PAIN: 0
FACIAL ASYMMETRY: 0
NECK STIFFNESS: 0
BRUISES/BLEEDS EASILY: 0
DIZZINESS: 0
ACTIVITY CHANGE: 0
CONFUSION: 0
RESPIRATORY NEGATIVE: 1
JOINT SWELLING: 0
HALLUCINATIONS: 0
GASTROINTESTINAL NEGATIVE: 1
STRIDOR: 0
EYE PAIN: 0
POLYDIPSIA: 0
MYALGIAS: 0
SHORTNESS OF BREATH: 0
APPETITE CHANGE: 0
COUGH: 0
NECK PAIN: 0
WEAKNESS: 0
FREQUENCY: 1
EYES NEGATIVE: 1
NAUSEA: 0
HEMATOLOGIC/LYMPHATIC NEGATIVE: 1
COLOR CHANGE: 0
HEADACHES: 0
BLOOD IN STOOL: 0
CHEST TIGHTNESS: 0
DIARRHEA: 0
DIFFICULTY URINATING: 0
TREMORS: 0
ARTHRALGIAS: 0
SEIZURES: 0
FLANK PAIN: 0
EYE DISCHARGE: 0
SLEEP DISTURBANCE: 0
PSYCHIATRIC NEGATIVE: 1
TROUBLE SWALLOWING: 0
CARDIOVASCULAR NEGATIVE: 1
SINUS PAIN: 0
MUSCULOSKELETAL NEGATIVE: 1
ENDOCRINE NEGATIVE: 1
ALLERGIC/IMMUNOLOGIC NEGATIVE: 1
ABDOMINAL PAIN: 0
PALPITATIONS: 0
WHEEZING: 0
ADENOPATHY: 0

## 2024-07-17 ASSESSMENT — PATIENT HEALTH QUESTIONNAIRE - PHQ9
2. FEELING DOWN, DEPRESSED OR HOPELESS: NOT AT ALL
1. LITTLE INTEREST OR PLEASURE IN DOING THINGS: NOT AT ALL
SUM OF ALL RESPONSES TO PHQ9 QUESTIONS 1 AND 2: 0

## 2024-07-17 NOTE — PROGRESS NOTES
Subjective     Patient ID: Zaki Terry is a 73 y.o. female who presents for Follow-up (Pt is here due to a 6 month follow up).  HPI    This is 72 yo female with complex medical problems including HLD, Depression, osteopenia, lumbar radiculitis, urinary frequency, cognitive complaints.     We reviewed her medical conditions during today's visit.      Patient has been feeling pretty good and has been complaint with prescribed medications.     We reviewed and discussed details of recent blood work: CBC, CMP, TSH, Lipid panel, Hb A1c, Vit D done in January 2024.  Results within acceptable range except elevated cholesterol and TG.    DEXA scan : 3/2024: osteopenia  Mammogram: march 2024: neg     She continues water exercises and swimming at least twice a week.    C/o urinary frequency and urge incontinence.   We discussed consulting urology, patient agreed.      Patient saw neurologist regarding memory issues.  CT brain showed mild atrophy, slight dilatation of right temporal horn of lateral ventricle.  Advised NP testing and Vit B12 level. Her Vit Vit [id number] was 746.    She underwent NP testing, no significant cognitive impairment  noted, it appears that her sx could be related to depression/anxiety.  She was advised that stress, anxiety, depression and fatigue may affect cognitive effectiveness.  I reviewed NP note.    She continues fluoxetine for anxiety sx.      Patient is requesting refill on gabapentin initially prescribed by pain management, she no longer sees pain management, gabapentin helps with radiculopathy sx.     Review of Systems   Constitutional: Negative.  Negative for activity change, appetite change and unexpected weight change.   HENT: Negative.  Negative for congestion, ear discharge, ear pain, hearing loss, mouth sores, nosebleeds, sinus pressure, sinus pain, sore throat, trouble swallowing and voice change.    Eyes: Negative.  Negative for pain, discharge, redness and visual disturbance.  "  Respiratory: Negative.  Negative for cough, chest tightness, shortness of breath, wheezing and stridor.    Cardiovascular: Negative.  Negative for chest pain, palpitations and leg swelling.   Gastrointestinal: Negative.  Negative for abdominal pain, blood in stool, constipation, diarrhea, nausea and vomiting.   Endocrine: Negative.  Negative for polydipsia, polyphagia and polyuria.   Genitourinary:  Positive for frequency. Negative for difficulty urinating, dysuria, flank pain and urgency.   Musculoskeletal: Negative.  Negative for arthralgias, back pain, gait problem, joint swelling, myalgias, neck pain and neck stiffness.   Skin: Negative.  Negative for color change, rash and wound.   Allergic/Immunologic: Negative.  Negative for environmental allergies, food allergies and immunocompromised state.   Neurological: Negative.  Negative for dizziness, tremors, seizures, syncope, facial asymmetry, speech difficulty, weakness, light-headedness, numbness and headaches.   Hematological: Negative.  Negative for adenopathy. Does not bruise/bleed easily.   Psychiatric/Behavioral: Negative.  Negative for agitation, behavioral problems, confusion, hallucinations, sleep disturbance and suicidal ideas. The patient is not nervous/anxious.    All other systems reviewed and are negative.      Objective     Review of systems was performed and all systems were negative except what in HPI    /60 (BP Location: Left arm, Patient Position: Sitting, BP Cuff Size: Adult)   Pulse 60   Temp 36.7 °C (98 °F) (Temporal)   Resp 16   Ht 1.6 m (5' 3\")   Wt 67.9 kg (149 lb 12.8 oz)   SpO2 98%   BMI 26.54 kg/m²    Physical Exam  Vitals and nursing note reviewed.   Constitutional:       General: She is not in acute distress.     Appearance: Normal appearance.   HENT:      Head: Normocephalic and atraumatic.      Right Ear: External ear normal.      Left Ear: External ear normal.      Nose: Nose normal. No congestion or rhinorrhea. "   Eyes:      General:         Right eye: No discharge.         Left eye: No discharge.      Extraocular Movements: Extraocular movements intact.      Conjunctiva/sclera: Conjunctivae normal.      Pupils: Pupils are equal, round, and reactive to light.   Cardiovascular:      Rate and Rhythm: Normal rate and regular rhythm.      Pulses: Normal pulses.      Heart sounds: Normal heart sounds. No murmur heard.     No friction rub. No gallop.   Pulmonary:      Effort: Pulmonary effort is normal. No respiratory distress.      Breath sounds: Normal breath sounds. No stridor. No wheezing, rhonchi or rales.   Chest:      Chest wall: No tenderness.   Abdominal:      General: Bowel sounds are normal.      Palpations: Abdomen is soft. There is no mass.      Tenderness: There is no abdominal tenderness. There is no guarding or rebound.   Musculoskeletal:         General: No swelling or deformity. Normal range of motion.      Cervical back: Normal range of motion and neck supple. No rigidity or tenderness.      Right lower leg: No edema.      Left lower leg: No edema.   Lymphadenopathy:      Cervical: No cervical adenopathy.   Skin:     General: Skin is warm and dry.      Coloration: Skin is not jaundiced.      Findings: No bruising or erythema.   Neurological:      General: No focal deficit present.      Mental Status: She is alert and oriented to person, place, and time. Mental status is at baseline.      Cranial Nerves: No cranial nerve deficit.      Motor: No weakness.      Coordination: Coordination normal.      Gait: Gait normal.   Psychiatric:         Mood and Affect: Mood normal.         Behavior: Behavior normal.         Thought Content: Thought content normal.         Judgment: Judgment normal.         Assessment/Plan   Problem List Items Addressed This Visit             ICD-10-CM       Cardiac and Vasculature    Hyperlipidemia E78.5     Low cholesterol and low carbohydrate diet is advised.              Endocrine/Metabolic    Vitamin D deficiency E55.9     Continue Vit D supplement.            Genitourinary and Reproductive    Urinary incontinence R32    Relevant Orders    Referral to Urology    Increased urinary frequency - Primary R35.0    Relevant Orders    Referral to Urology       Mental Health    Major depressive disorder, recurrent, moderate (Multi) F33.1     Clinically stable. Continue Fuoxetine. Will monitor.             Musculoskeletal and Injuries    Osteopenia M85.80     Please maintain enough calcium in your diet:  6897-7747 mg daily (consume foods rich in calcium rather than taking only calcium supplement to meet daily calcium requirements), take daily Vitamin D supplement with meal. Average Vit D dose is 2000 international units daily.  Weight bearing exercise routine is recommended.             Neuro    Lumbar radiculitis M54.16     Clinically stable. Symptoms controlled with Gabapentin.         Relevant Medications    gabapentin (Neurontin) 100 mg capsule   It was a pleasure to see you today.  I would like to remind you about importance of a healthy lifestyle in order to improve your well-being and live longer.  Try to engage in physical activities for at least 150 minutes per week.  Eat about 10 servings of fruits and vegetables daily. My advice is 2 servings of fruits and 8 servings of vegetables.  For vegetables choose at least half of them green and at least half of them fresh.  Please avoid sugar, salt, fried food and saturated fat.    I spent a total of 32 minutes on the date of service for follow up visit, which included preparing to see the patient, face-to-face patient care, completing clinical documentation, obtaining and/or reviewing separately obtained history, performing a medically appropriate examination, counseling and educating the patient/family/caregiver, ordering medications, tests, or procedures, communicating with other health care providers (not separately reported),  independently interpreting results (not separately reported), communicating results to the patient/family/caregiver, and care coordination (not separately reported).    F/up in 6 months or sooner if needed.

## 2024-07-18 NOTE — ASSESSMENT & PLAN NOTE
Please maintain enough calcium in your diet:  3154-8325 mg daily (consume foods rich in calcium rather than taking only calcium supplement to meet daily calcium requirements), take daily Vitamin D supplement with meal. Average Vit D dose is 2000 international units daily.  Weight bearing exercise routine is recommended.

## 2024-08-30 ENCOUNTER — APPOINTMENT (OUTPATIENT)
Dept: UROLOGY | Facility: CLINIC | Age: 74
End: 2024-08-30
Payer: MEDICARE

## 2024-08-30 VITALS
BODY MASS INDEX: 26.72 KG/M2 | DIASTOLIC BLOOD PRESSURE: 70 MMHG | SYSTOLIC BLOOD PRESSURE: 123 MMHG | HEART RATE: 56 BPM | WEIGHT: 150.8 LBS | TEMPERATURE: 97.5 F | HEIGHT: 63 IN

## 2024-08-30 DIAGNOSIS — N39.41 URGE INCONTINENCE OF URINE: ICD-10-CM

## 2024-08-30 DIAGNOSIS — R35.0 INCREASED URINARY FREQUENCY: ICD-10-CM

## 2024-08-30 LAB
AMORPH CRY #/AREA UR COMP ASSIST: ABNORMAL /HPF
APPEARANCE UR: ABNORMAL
BILIRUB UR STRIP.AUTO-MCNC: NEGATIVE MG/DL
CAOX CRY #/AREA UR COMP ASSIST: ABNORMAL /HPF
COLOR UR: ABNORMAL
GLUCOSE UR STRIP.AUTO-MCNC: NORMAL MG/DL
HOLD SPECIMEN: NORMAL
KETONES UR STRIP.AUTO-MCNC: NEGATIVE MG/DL
LEUKOCYTE ESTERASE UR QL STRIP.AUTO: ABNORMAL
MUCOUS THREADS #/AREA URNS AUTO: ABNORMAL /LPF
NITRITE UR QL STRIP.AUTO: NEGATIVE
PH UR STRIP.AUTO: 5.5 [PH]
POC APPEARANCE, URINE: CLEAR
POC BILIRUBIN, URINE: NEGATIVE
POC BLOOD, URINE: ABNORMAL
POC COLOR, URINE: YELLOW
POC GLUCOSE, URINE: NEGATIVE MG/DL
POC KETONES, URINE: NEGATIVE MG/DL
POC LEUKOCYTES, URINE: ABNORMAL
POC NITRITE,URINE: NEGATIVE
POC PH, URINE: 5.5 PH
POC PROTEIN, URINE: NEGATIVE MG/DL
POC SPECIFIC GRAVITY, URINE: 1.02
POC UROBILINOGEN, URINE: 0.2 EU/DL
PROT UR STRIP.AUTO-MCNC: NEGATIVE MG/DL
RBC # UR STRIP.AUTO: NEGATIVE /UL
RBC #/AREA URNS AUTO: ABNORMAL /HPF
SP GR UR STRIP.AUTO: 1.02
UROBILINOGEN UR STRIP.AUTO-MCNC: NORMAL MG/DL
WBC #/AREA URNS AUTO: ABNORMAL /HPF

## 2024-08-30 PROCEDURE — 1157F ADVNC CARE PLAN IN RCRD: CPT | Performed by: NURSE PRACTITIONER

## 2024-08-30 PROCEDURE — G2211 COMPLEX E/M VISIT ADD ON: HCPCS | Performed by: NURSE PRACTITIONER

## 2024-08-30 PROCEDURE — 1123F ACP DISCUSS/DSCN MKR DOCD: CPT | Performed by: NURSE PRACTITIONER

## 2024-08-30 PROCEDURE — 1159F MED LIST DOCD IN RCRD: CPT | Performed by: NURSE PRACTITIONER

## 2024-08-30 PROCEDURE — 99203 OFFICE O/P NEW LOW 30 MIN: CPT | Performed by: NURSE PRACTITIONER

## 2024-08-30 PROCEDURE — 87086 URINE CULTURE/COLONY COUNT: CPT

## 2024-08-30 PROCEDURE — 81003 URINALYSIS AUTO W/O SCOPE: CPT | Performed by: NURSE PRACTITIONER

## 2024-08-30 PROCEDURE — 81001 URINALYSIS AUTO W/SCOPE: CPT

## 2024-08-30 NOTE — PROGRESS NOTES
Subjective   Patient ID: Zaki Terry is a 73 y.o. female who presents for Urinary Frequency and Urinary Incontinence.  Patient presents for eval of urgency and urge incontinence. Previously seen by GYN and tried medication. Unsure of name, felt that symptoms weren't severe and she decided not to continue.     She is currently wearing panty liners. She notices some amount of leakage typically when she stands up, especially first thing in the morning. Urinating 4 times/day, denies nocturia. Denies nocturnal enuresis. Feels that urgency is the primary cause of leakage.     Drinks 2 cups of coffee in AM, iced tea with lunch, ETOH in evening (1 glass), and 1 cup of milk before bed. Sometimes water at lunch. Drinks until bedtime.         Review of Systems   All other systems reviewed and are negative.      Objective   Physical Exam  Vitals reviewed.     Constitutional: Patient generally appears stated age. Good nutrition. No deformities. Good attention to grooming.  Neck: No neck masses. Good symmetry. No crepitus. Normal thyroid size and consistency with no masses.  Respiratory: Normal respiratory effort. No intercostal retractions. No use of accessory muscles.  Cardiovascular: Examination of the peripheral vascular system reveals no swelling or varicosities with good pulses. Normal extremity temperature, no edema or tenderness.  Abdomen: Examination of the abdomen reveals no masses or tenderness. No hernias detected. Normal liver and spleen size.   Lymphatic: No palpable lymph nodes in the neck, axilla, groin or other locations  Skin: Inspection of the skin reveals no rashes, lesions, ulcers.  Neurologic/Psychiatric: Oriented to time, place and person. Normal mood and affect with no depression, anxiety, or agitation.    Office Visit on 08/30/2024   Component Date Value Ref Range Status    POC Color, Urine 08/30/2024 Yellow  Straw, Yellow, Light-Yellow Final    POC Appearance, Urine 08/30/2024 Clear  Clear Final     POC Glucose, Urine 08/30/2024 NEGATIVE  NEGATIVE mg/dl Final    POC Bilirubin, Urine 08/30/2024 NEGATIVE  NEGATIVE Final    POC Ketones, Urine 08/30/2024 NEGATIVE  NEGATIVE mg/dl Final    POC Specific Gravity, Urine 08/30/2024 1.020  1.005 - 1.035 Final    POC Blood, Urine 08/30/2024 TRACE-Intact (A)  NEGATIVE Final    POC PH, Urine 08/30/2024 5.5  No Reference Range Established PH Final    POC Protein, Urine 08/30/2024 NEGATIVE  NEGATIVE, 30 (1+) mg/dl Final    POC Urobilinogen, Urine 08/30/2024 0.2  0.2, 1.0 EU/DL Final    Poc Nitrite, Urine 08/30/2024 NEGATIVE  NEGATIVE Final    POC Leukocytes, Urine 08/30/2024 TRACE (A)  NEGATIVE Final         Assessment/Plan   Diagnoses and all orders for this visit:  Increased urinary frequency  -     Referral to Urology  -     POCT UA Automated manually resulted  -     Post-Void Residual  Urge incontinence of urine  -     Referral to Urology  -     POCT UA Automated manually resulted  -     Post-Void Residual  -     Urinalysis with Reflex Culture and Microscopic; Future    Given information regarding Bladder Matters and discussed appropriate lifestyle modifications. Declining medications at this time. Will contact me if she would like medication. Follow up PRN.        ELADIO Gomez-CNP 08/30/24 10:26 AM

## 2024-09-01 LAB — BACTERIA UR CULT: NORMAL

## 2024-09-02 DIAGNOSIS — R82.998 CALCIUM OXALATE CRYSTALS IN URINE: Primary | ICD-10-CM

## 2024-09-06 ENCOUNTER — HOSPITAL ENCOUNTER (OUTPATIENT)
Dept: RADIOLOGY | Facility: HOSPITAL | Age: 74
Discharge: HOME | End: 2024-09-06
Payer: MEDICARE

## 2024-09-06 DIAGNOSIS — R82.998 CALCIUM OXALATE CRYSTALS IN URINE: ICD-10-CM

## 2024-09-06 PROCEDURE — 76770 US EXAM ABDO BACK WALL COMP: CPT

## 2025-01-15 ENCOUNTER — APPOINTMENT (OUTPATIENT)
Dept: PRIMARY CARE | Facility: CLINIC | Age: 75
End: 2025-01-15
Payer: MEDICARE

## 2025-01-15 VITALS
BODY MASS INDEX: 26.79 KG/M2 | HEART RATE: 85 BPM | OXYGEN SATURATION: 98 % | TEMPERATURE: 98 F | HEIGHT: 63 IN | WEIGHT: 151.2 LBS | SYSTOLIC BLOOD PRESSURE: 132 MMHG | DIASTOLIC BLOOD PRESSURE: 60 MMHG | RESPIRATION RATE: 16 BRPM

## 2025-01-15 DIAGNOSIS — Z00.00 ROUTINE GENERAL MEDICAL EXAMINATION AT HEALTH CARE FACILITY: Primary | ICD-10-CM

## 2025-01-15 DIAGNOSIS — M85.89 OSTEOPENIA OF MULTIPLE SITES: ICD-10-CM

## 2025-01-15 DIAGNOSIS — E55.9 VITAMIN D DEFICIENCY: ICD-10-CM

## 2025-01-15 DIAGNOSIS — Z12.31 ENCOUNTER FOR SCREENING MAMMOGRAM FOR BREAST CANCER: ICD-10-CM

## 2025-01-15 DIAGNOSIS — M54.16 LUMBAR RADICULITIS: ICD-10-CM

## 2025-01-15 DIAGNOSIS — F33.1 MAJOR DEPRESSIVE DISORDER, RECURRENT, MODERATE: ICD-10-CM

## 2025-01-15 DIAGNOSIS — E78.2 MIXED HYPERLIPIDEMIA: ICD-10-CM

## 2025-01-15 PROCEDURE — 1160F RVW MEDS BY RX/DR IN RCRD: CPT | Performed by: INTERNAL MEDICINE

## 2025-01-15 PROCEDURE — G0439 PPPS, SUBSEQ VISIT: HCPCS | Performed by: INTERNAL MEDICINE

## 2025-01-15 PROCEDURE — 99214 OFFICE O/P EST MOD 30 MIN: CPT | Performed by: INTERNAL MEDICINE

## 2025-01-15 PROCEDURE — 1159F MED LIST DOCD IN RCRD: CPT | Performed by: INTERNAL MEDICINE

## 2025-01-15 PROCEDURE — 1158F ADVNC CARE PLAN TLK DOCD: CPT | Performed by: INTERNAL MEDICINE

## 2025-01-15 PROCEDURE — 1157F ADVNC CARE PLAN IN RCRD: CPT | Performed by: INTERNAL MEDICINE

## 2025-01-15 PROCEDURE — 1170F FXNL STATUS ASSESSED: CPT | Performed by: INTERNAL MEDICINE

## 2025-01-15 PROCEDURE — 1123F ACP DISCUSS/DSCN MKR DOCD: CPT | Performed by: INTERNAL MEDICINE

## 2025-01-15 PROCEDURE — 1036F TOBACCO NON-USER: CPT | Performed by: INTERNAL MEDICINE

## 2025-01-15 PROCEDURE — G2211 COMPLEX E/M VISIT ADD ON: HCPCS | Performed by: INTERNAL MEDICINE

## 2025-01-15 ASSESSMENT — ACTIVITIES OF DAILY LIVING (ADL)
DOING_HOUSEWORK: INDEPENDENT
DRESSING: INDEPENDENT
MANAGING_FINANCES: INDEPENDENT
BATHING: INDEPENDENT
GROCERY_SHOPPING: INDEPENDENT
TAKING_MEDICATION: INDEPENDENT

## 2025-01-15 ASSESSMENT — ENCOUNTER SYMPTOMS
FACIAL ASYMMETRY: 0
SHORTNESS OF BREATH: 0
ENDOCRINE NEGATIVE: 1
OCCASIONAL FEELINGS OF UNSTEADINESS: 0
MYALGIAS: 0
HEMATOLOGIC/LYMPHATIC NEGATIVE: 1
UNEXPECTED WEIGHT CHANGE: 0
ARTHRALGIAS: 1
HALLUCINATIONS: 0
CONSTITUTIONAL NEGATIVE: 1
EYE PAIN: 0
BLOOD IN STOOL: 0
GASTROINTESTINAL NEGATIVE: 1
SINUS PAIN: 0
ADENOPATHY: 0
TREMORS: 0
SEIZURES: 0
POLYPHAGIA: 0
COLOR CHANGE: 0
FREQUENCY: 0
RESPIRATORY NEGATIVE: 1
DEPRESSION: 0
NEUROLOGICAL NEGATIVE: 1
HEADACHES: 0
NAUSEA: 0
WEAKNESS: 0
CONSTIPATION: 0
CHEST TIGHTNESS: 0
NERVOUS/ANXIOUS: 0
APPETITE CHANGE: 0
EYE DISCHARGE: 0
DIFFICULTY URINATING: 0
ABDOMINAL PAIN: 0
WOUND: 0
CARDIOVASCULAR NEGATIVE: 1
FLANK PAIN: 0
SINUS PRESSURE: 0
NECK PAIN: 0
AGITATION: 0
DYSURIA: 0
SLEEP DISTURBANCE: 0
VOMITING: 0
TROUBLE SWALLOWING: 0
BRUISES/BLEEDS EASILY: 0
EYE REDNESS: 0
VOICE CHANGE: 0
PSYCHIATRIC NEGATIVE: 1
LOSS OF SENSATION IN FEET: 0
DIZZINESS: 0
BACK PAIN: 0
NECK STIFFNESS: 0
SORE THROAT: 0
CONFUSION: 0
PALPITATIONS: 0
COUGH: 0
POLYDIPSIA: 0
LIGHT-HEADEDNESS: 0
ALLERGIC/IMMUNOLOGIC NEGATIVE: 1
ACTIVITY CHANGE: 0
WHEEZING: 0
EYES NEGATIVE: 1
SPEECH DIFFICULTY: 0
DIARRHEA: 0
JOINT SWELLING: 0
NUMBNESS: 0
STRIDOR: 0

## 2025-01-15 ASSESSMENT — PATIENT HEALTH QUESTIONNAIRE - PHQ9
2. FEELING DOWN, DEPRESSED OR HOPELESS: NOT AT ALL
SUM OF ALL RESPONSES TO PHQ9 QUESTIONS 1 AND 2: 0
1. LITTLE INTEREST OR PLEASURE IN DOING THINGS: NOT AT ALL

## 2025-01-15 NOTE — PROGRESS NOTES
Subjective   Reason for Visit: Zaki Terry is an 74 y.o. female here for a Medicare Wellness visit.     Past Medical, Surgical, and Family History reviewed and updated in chart.    Reviewed all medications by prescribing practitioner or clinical pharmacist (such as prescriptions, OTCs, herbal therapies and supplements) and documented in the medical record.    HPI    Patient Care Team:  Tracy Carnes MD PhD as PCP - General  Tracy Carnes MD PhD as PCP - [facility] Attributed Provider     Patient comes for Medicare Wellness, Physical Exam and Follow up visit.     Patient has been feeling pretty good and has been complaint with prescribed medications.    We reviewed blood work including CBC, CMP, TSH, Lipid Panel, HbA1c, Vit D level, Vit B12 level done in 2024.  Results within acceptable range.  Mildly elevated cholesterol noted.     Mammogram: 3/2024  DEXA: 2024: osteopenia  Colon ca screening: n/a  Pneumonia Vacc: 2021  Advance Directives: Healthcare Living Will and POA  on file.      This is 74 yo female with complex medical problems including HLD, Depression, osteopenia, lumbar radiculitis, urinary frequency, cognitive complaints.      We reviewed her medical conditions during today's visit.     She continues water exercises and swimming at least twice a week.     C/o urinary frequency and urge incontinence.   She consulted urology, advised lifestyle modifications and special exercise routine.  I reviewed urology visit note (August 2024).        Patient saw neurologist regarding memory issues.  CT brain showed mild atrophy, slight dilatation of right temporal horn of lateral ventricle.  Advised NP testing and Vit B12 level. Her Vit Vit [id number] was 746.     She underwent NP testing, no significant cognitive impairment  noted, it appears that her sx could be related to depression/anxiety.  She was advised that stress, anxiety, depression and fatigue may affect cognitive effectiveness.  I reviewed  NP note.     She continues fluoxetine for anxiety sx.       Patient is requesting that I refill her gabapentin,  initially prescribed by pain management, she no longer sees pain management, gabapentin helps with radiculopathy sx.       Review of Systems   Constitutional: Negative.  Negative for activity change, appetite change and unexpected weight change.   HENT: Negative.  Negative for congestion, ear discharge, ear pain, hearing loss, mouth sores, nosebleeds, sinus pressure, sinus pain, sore throat, trouble swallowing and voice change.    Eyes: Negative.  Negative for pain, discharge, redness and visual disturbance.   Respiratory: Negative.  Negative for cough, chest tightness, shortness of breath, wheezing and stridor.    Cardiovascular: Negative.  Negative for chest pain, palpitations and leg swelling.   Gastrointestinal: Negative.  Negative for abdominal pain, blood in stool, constipation, diarrhea, nausea and vomiting.   Endocrine: Negative.  Negative for polydipsia, polyphagia and polyuria.   Genitourinary: Negative.  Negative for difficulty urinating, dysuria, flank pain, frequency and urgency.   Musculoskeletal:  Positive for arthralgias. Negative for back pain, gait problem, joint swelling, myalgias, neck pain and neck stiffness.   Skin: Negative.  Negative for color change, rash and wound.   Allergic/Immunologic: Negative.  Negative for environmental allergies, food allergies and immunocompromised state.   Neurological: Negative.  Negative for dizziness, tremors, seizures, syncope, facial asymmetry, speech difficulty, weakness, light-headedness, numbness and headaches.   Hematological: Negative.  Negative for adenopathy. Does not bruise/bleed easily.   Psychiatric/Behavioral: Negative.  Negative for agitation, behavioral problems, confusion, hallucinations, sleep disturbance and suicidal ideas. The patient is not nervous/anxious.    All other systems reviewed and are negative.      Objective  "  Vitals:  /60 (BP Location: Left arm, Patient Position: Sitting, BP Cuff Size: Adult)   Pulse 85   Temp 36.7 °C (98 °F) (Temporal)   Resp 16   Ht 1.6 m (5' 3\")   Wt 68.6 kg (151 lb 3.2 oz)   SpO2 98%   BMI 26.78 kg/m²       Physical Exam  Vitals and nursing note reviewed.   Constitutional:       General: She is not in acute distress.     Appearance: Normal appearance.   HENT:      Head: Normocephalic and atraumatic.      Right Ear: Tympanic membrane, ear canal and external ear normal.      Left Ear: Tympanic membrane, ear canal and external ear normal.      Nose: Nose normal. No congestion or rhinorrhea.      Mouth/Throat:      Mouth: Mucous membranes are moist.      Pharynx: Oropharynx is clear.   Eyes:      General:         Right eye: No discharge.         Left eye: No discharge.      Extraocular Movements: Extraocular movements intact.      Conjunctiva/sclera: Conjunctivae normal.      Pupils: Pupils are equal, round, and reactive to light.   Cardiovascular:      Rate and Rhythm: Normal rate and regular rhythm.      Pulses: Normal pulses.      Heart sounds: Normal heart sounds. No murmur heard.     No friction rub. No gallop.   Pulmonary:      Effort: Pulmonary effort is normal. No respiratory distress.      Breath sounds: Normal breath sounds. No stridor. No wheezing, rhonchi or rales.   Chest:      Chest wall: No tenderness.   Abdominal:      General: Bowel sounds are normal.      Palpations: Abdomen is soft. There is no mass.      Tenderness: There is no abdominal tenderness. There is no guarding or rebound.   Musculoskeletal:         General: No swelling or deformity. Normal range of motion.      Cervical back: Normal range of motion and neck supple. No rigidity or tenderness.      Right lower leg: No edema.      Left lower leg: No edema.   Lymphadenopathy:      Cervical: No cervical adenopathy.   Skin:     General: Skin is warm and dry.      Coloration: Skin is not jaundiced.      Findings: No " bruising or erythema.   Neurological:      General: No focal deficit present.      Mental Status: She is alert and oriented to person, place, and time. Mental status is at baseline.      Cranial Nerves: No cranial nerve deficit.      Motor: No weakness.      Coordination: Coordination normal.      Gait: Gait normal.   Psychiatric:         Mood and Affect: Mood normal.         Behavior: Behavior normal.         Thought Content: Thought content normal.         Judgment: Judgment normal.         Assessment & Plan  Routine general medical examination at health care facility    Orders:    1 Year Follow Up In [facility] - PCP - Wellness Exam; Future    Encounter for screening mammogram for breast cancer    Orders:    BI mammo bilateral screening tomosynthesis; Future    Mixed hyperlipidemia  Low cholesterol and low carbohydrate diet is advised.     Orders:    Comprehensive Metabolic Panel; Future    Lipid Panel; Future    TSH with reflex to Free T4 if abnormal; Future    Vitamin D deficiency  Continue Vit D supplement.    Orders:    Vitamin D 25-Hydroxy,Total (for eval of Vitamin D levels); Future    Major depressive disorder, recurrent, moderate  Clinically stable. Continue Fuoxetine. Will monitor.          Osteopenia of multiple sites  Please maintain enough calcium in your diet:  7069-1668 mg daily (consume foods rich in calcium rather than taking only calcium supplement to meet daily calcium requirements), take daily Vitamin D supplement with meal. Average Vit D dose is 2000 international units daily.  Weight bearing exercise routine is recommended.          Lumbar radiculitis  Clinically stable. Symptoms controlled with Gabapentin.    Orders:    CBC; Future     It was a pleasure to see you today.  I would like to remind you about importance of a healthy lifestyle in order to improve your well-being and live longer.  Try to engage in physical activities for at least 150 minutes per week.  Eat about 10  servings of fruits and vegetables daily. My advice is 2 servings of fruits and 8 servings of vegetables.  For vegetables choose at least half of them green and at least half of them fresh.  Please avoid sugar, salt, fried food and saturated fat.    I spent a total of 30 minutes on the date of service for follow up visit, which included preparing to see the patient, face-to-face patient care, completing clinical documentation, obtaining and/or reviewing separately obtained history, performing a medically appropriate examination, counseling and educating the patient/family/caregiver, ordering medications, tests, or procedures, communicating with other health care providers (not separately reported), independently interpreting results (not separately reported), communicating results to the patient/family/caregiver, and care coordination (not separately reported).

## 2025-01-15 NOTE — ASSESSMENT & PLAN NOTE
Low cholesterol and low carbohydrate diet is advised.     Orders:    Comprehensive Metabolic Panel; Future    Lipid Panel; Future    TSH with reflex to Free T4 if abnormal; Future

## 2025-01-15 NOTE — ASSESSMENT & PLAN NOTE
Continue Vit D supplement.    Orders:    Vitamin D 25-Hydroxy,Total (for eval of Vitamin D levels); Future

## 2025-01-15 NOTE — ASSESSMENT & PLAN NOTE
Please maintain enough calcium in your diet:  0077-1234 mg daily (consume foods rich in calcium rather than taking only calcium supplement to meet daily calcium requirements), take daily Vitamin D supplement with meal. Average Vit D dose is 2000 international units daily.  Weight bearing exercise routine is recommended.

## 2025-01-16 ENCOUNTER — LAB (OUTPATIENT)
Dept: LAB | Facility: LAB | Age: 75
End: 2025-01-16
Payer: MEDICARE

## 2025-01-16 DIAGNOSIS — M54.16 LUMBAR RADICULITIS: ICD-10-CM

## 2025-01-16 DIAGNOSIS — E78.2 MIXED HYPERLIPIDEMIA: ICD-10-CM

## 2025-01-16 DIAGNOSIS — E55.9 VITAMIN D DEFICIENCY: ICD-10-CM

## 2025-01-16 LAB
25(OH)D3 SERPL-MCNC: 44 NG/ML (ref 30–100)
ALBUMIN SERPL BCP-MCNC: 4.2 G/DL (ref 3.4–5)
ALP SERPL-CCNC: 58 U/L (ref 33–136)
ALT SERPL W P-5'-P-CCNC: 16 U/L (ref 7–45)
ANION GAP SERPL CALC-SCNC: 13 MMOL/L (ref 10–20)
AST SERPL W P-5'-P-CCNC: 22 U/L (ref 9–39)
BILIRUB SERPL-MCNC: 0.5 MG/DL (ref 0–1.2)
BUN SERPL-MCNC: 19 MG/DL (ref 6–23)
CALCIUM SERPL-MCNC: 9.9 MG/DL (ref 8.6–10.6)
CHLORIDE SERPL-SCNC: 104 MMOL/L (ref 98–107)
CHOLEST SERPL-MCNC: 187 MG/DL (ref 0–199)
CHOLESTEROL/HDL RATIO: 3.5
CO2 SERPL-SCNC: 29 MMOL/L (ref 21–32)
CREAT SERPL-MCNC: 0.79 MG/DL (ref 0.5–1.05)
EGFRCR SERPLBLD CKD-EPI 2021: 78 ML/MIN/1.73M*2
ERYTHROCYTE [DISTWIDTH] IN BLOOD BY AUTOMATED COUNT: 13.9 % (ref 11.5–14.5)
GLUCOSE SERPL-MCNC: 80 MG/DL (ref 74–99)
HCT VFR BLD AUTO: 41.5 % (ref 36–46)
HDLC SERPL-MCNC: 53.7 MG/DL
HGB BLD-MCNC: 13.4 G/DL (ref 12–16)
LDLC SERPL CALC-MCNC: 110 MG/DL
MCH RBC QN AUTO: 30.5 PG (ref 26–34)
MCHC RBC AUTO-ENTMCNC: 32.3 G/DL (ref 32–36)
MCV RBC AUTO: 94 FL (ref 80–100)
NON HDL CHOLESTEROL: 133 MG/DL (ref 0–149)
NRBC BLD-RTO: 0 /100 WBCS (ref 0–0)
PLATELET # BLD AUTO: 266 X10*3/UL (ref 150–450)
POTASSIUM SERPL-SCNC: 4.8 MMOL/L (ref 3.5–5.3)
PROT SERPL-MCNC: 7.4 G/DL (ref 6.4–8.2)
RBC # BLD AUTO: 4.4 X10*6/UL (ref 4–5.2)
SODIUM SERPL-SCNC: 141 MMOL/L (ref 136–145)
TRIGL SERPL-MCNC: 115 MG/DL (ref 0–149)
TSH SERPL-ACNC: 1.41 MIU/L (ref 0.44–3.98)
VLDL: 23 MG/DL (ref 0–40)
WBC # BLD AUTO: 4.9 X10*3/UL (ref 4.4–11.3)

## 2025-01-16 PROCEDURE — 80053 COMPREHEN METABOLIC PANEL: CPT

## 2025-01-16 PROCEDURE — 82306 VITAMIN D 25 HYDROXY: CPT

## 2025-01-16 PROCEDURE — 85027 COMPLETE CBC AUTOMATED: CPT

## 2025-01-16 PROCEDURE — 80061 LIPID PANEL: CPT

## 2025-01-16 PROCEDURE — 84443 ASSAY THYROID STIM HORMONE: CPT

## 2025-01-19 NOTE — RESULT ENCOUNTER NOTE
Your recent lab work was acceptable. All results may not be within normal range but they are not clinically significant at this time and do not require change in your therapy. We will discuss details during your next office visit. Please keep your next appointment as scheduled. Dr. Anthony

## 2025-01-27 DIAGNOSIS — F33.1 MAJOR DEPRESSIVE DISORDER, RECURRENT, MODERATE: ICD-10-CM

## 2025-01-27 DIAGNOSIS — M54.16 LUMBAR RADICULITIS: ICD-10-CM

## 2025-01-27 RX ORDER — FLUOXETINE HYDROCHLORIDE 20 MG/1
20 CAPSULE ORAL DAILY
Qty: 90 CAPSULE | Refills: 0 | Status: SHIPPED | OUTPATIENT
Start: 2025-01-27

## 2025-01-27 RX ORDER — GABAPENTIN 100 MG/1
100 CAPSULE ORAL 3 TIMES DAILY
Qty: 270 CAPSULE | Refills: 0 | Status: SHIPPED | OUTPATIENT
Start: 2025-01-27

## 2025-03-05 ENCOUNTER — HOSPITAL ENCOUNTER (OUTPATIENT)
Dept: RADIOLOGY | Facility: CLINIC | Age: 75
Discharge: HOME | End: 2025-03-05
Payer: MEDICARE

## 2025-03-05 VITALS — BODY MASS INDEX: 26.8 KG/M2 | HEIGHT: 63 IN | WEIGHT: 151.24 LBS

## 2025-03-05 DIAGNOSIS — Z12.31 ENCOUNTER FOR SCREENING MAMMOGRAM FOR BREAST CANCER: ICD-10-CM

## 2025-03-05 PROCEDURE — 77067 SCR MAMMO BI INCL CAD: CPT | Performed by: RADIOLOGY

## 2025-03-05 PROCEDURE — 77063 BREAST TOMOSYNTHESIS BI: CPT | Performed by: RADIOLOGY

## 2025-03-05 PROCEDURE — 77067 SCR MAMMO BI INCL CAD: CPT

## 2025-05-01 DIAGNOSIS — F33.1 MAJOR DEPRESSIVE DISORDER, RECURRENT, MODERATE: ICD-10-CM

## 2025-05-01 RX ORDER — FLUOXETINE HYDROCHLORIDE 20 MG/1
20 CAPSULE ORAL DAILY
Qty: 90 CAPSULE | Refills: 0 | Status: SHIPPED | OUTPATIENT
Start: 2025-05-01

## 2025-06-10 DIAGNOSIS — M54.16 LUMBAR RADICULITIS: ICD-10-CM

## 2025-06-10 RX ORDER — GABAPENTIN 100 MG/1
100 CAPSULE ORAL 3 TIMES DAILY
Qty: 270 CAPSULE | Refills: 0 | Status: SHIPPED | OUTPATIENT
Start: 2025-06-10

## 2025-07-15 ENCOUNTER — APPOINTMENT (OUTPATIENT)
Dept: PRIMARY CARE | Facility: CLINIC | Age: 75
End: 2025-07-15
Payer: MEDICARE

## 2025-07-15 VITALS
RESPIRATION RATE: 18 BRPM | HEART RATE: 74 BPM | TEMPERATURE: 97.4 F | WEIGHT: 148.2 LBS | BODY MASS INDEX: 25.3 KG/M2 | HEIGHT: 64 IN | OXYGEN SATURATION: 96 % | SYSTOLIC BLOOD PRESSURE: 110 MMHG | DIASTOLIC BLOOD PRESSURE: 52 MMHG

## 2025-07-15 DIAGNOSIS — M85.89 OSTEOPENIA OF MULTIPLE SITES: ICD-10-CM

## 2025-07-15 DIAGNOSIS — E78.2 MIXED HYPERLIPIDEMIA: Primary | ICD-10-CM

## 2025-07-15 DIAGNOSIS — M54.16 LUMBAR RADICULITIS: ICD-10-CM

## 2025-07-15 DIAGNOSIS — F33.1 MAJOR DEPRESSIVE DISORDER, RECURRENT, MODERATE: ICD-10-CM

## 2025-07-15 DIAGNOSIS — H61.22 IMPACTED CERUMEN OF LEFT EAR: ICD-10-CM

## 2025-07-15 DIAGNOSIS — Z00.00 ROUTINE GENERAL MEDICAL EXAMINATION AT HEALTH CARE FACILITY: ICD-10-CM

## 2025-07-15 PROCEDURE — 1159F MED LIST DOCD IN RCRD: CPT | Performed by: INTERNAL MEDICINE

## 2025-07-15 PROCEDURE — 99214 OFFICE O/P EST MOD 30 MIN: CPT | Performed by: INTERNAL MEDICINE

## 2025-07-15 PROCEDURE — 1160F RVW MEDS BY RX/DR IN RCRD: CPT | Performed by: INTERNAL MEDICINE

## 2025-07-15 PROCEDURE — G2211 COMPLEX E/M VISIT ADD ON: HCPCS | Performed by: INTERNAL MEDICINE

## 2025-07-15 PROCEDURE — 1126F AMNT PAIN NOTED NONE PRSNT: CPT | Performed by: INTERNAL MEDICINE

## 2025-07-15 PROCEDURE — 1036F TOBACCO NON-USER: CPT | Performed by: INTERNAL MEDICINE

## 2025-07-15 RX ORDER — FLUOXETINE 20 MG/1
20 CAPSULE ORAL DAILY
Qty: 90 CAPSULE | Refills: 3 | Status: SHIPPED | OUTPATIENT
Start: 2025-07-15 | End: 2026-07-15

## 2025-07-15 ASSESSMENT — ENCOUNTER SYMPTOMS
TREMORS: 0
NUMBNESS: 0
FREQUENCY: 1
RESPIRATORY NEGATIVE: 1
WOUND: 0
CONSTIPATION: 0
DIFFICULTY URINATING: 0
HALLUCINATIONS: 0
NAUSEA: 0
NEUROLOGICAL NEGATIVE: 1
ABDOMINAL PAIN: 0
FACIAL ASYMMETRY: 0
APPETITE CHANGE: 0
UNEXPECTED WEIGHT CHANGE: 0
LIGHT-HEADEDNESS: 0
EYE PAIN: 0
SINUS PAIN: 0
DIZZINESS: 0
POLYPHAGIA: 0
NECK STIFFNESS: 0
PSYCHIATRIC NEGATIVE: 1
CARDIOVASCULAR NEGATIVE: 1
ACTIVITY CHANGE: 0
NERVOUS/ANXIOUS: 0
SINUS PRESSURE: 0
NECK PAIN: 0
ADENOPATHY: 0
GASTROINTESTINAL NEGATIVE: 1
CHEST TIGHTNESS: 0
WEAKNESS: 0
HEMATOLOGIC/LYMPHATIC NEGATIVE: 1
VOICE CHANGE: 0
EYES NEGATIVE: 1
DYSURIA: 0
COUGH: 0
DIARRHEA: 0
ALLERGIC/IMMUNOLOGIC NEGATIVE: 1
CONSTITUTIONAL NEGATIVE: 1
SPEECH DIFFICULTY: 0
FLANK PAIN: 0
ENDOCRINE NEGATIVE: 1
JOINT SWELLING: 0
VOMITING: 0
HEADACHES: 0
AGITATION: 0
ARTHRALGIAS: 1
TROUBLE SWALLOWING: 0
MYALGIAS: 0
SEIZURES: 0
PALPITATIONS: 0
SORE THROAT: 0
BRUISES/BLEEDS EASILY: 0
POLYDIPSIA: 0
EYE DISCHARGE: 0
EYE REDNESS: 0
WHEEZING: 0
COLOR CHANGE: 0
BACK PAIN: 1
BLOOD IN STOOL: 0
CONFUSION: 0
SLEEP DISTURBANCE: 0
STRIDOR: 0
SHORTNESS OF BREATH: 0

## 2025-07-15 ASSESSMENT — PAIN SCALES - GENERAL: PAINLEVEL_OUTOF10: 0-NO PAIN

## 2025-07-15 ASSESSMENT — PATIENT HEALTH QUESTIONNAIRE - PHQ9
1. LITTLE INTEREST OR PLEASURE IN DOING THINGS: NOT AT ALL
2. FEELING DOWN, DEPRESSED OR HOPELESS: NOT AT ALL
SUM OF ALL RESPONSES TO PHQ9 QUESTIONS 1 AND 2: 0

## 2025-07-15 NOTE — ASSESSMENT & PLAN NOTE
Please maintain enough calcium in your diet:  9437-8340 mg daily (consume foods rich in calcium rather than taking only calcium supplement to meet daily calcium requirements), take daily Vitamin D supplement with meal. Average Vit D dose is 2000 international units daily.  Weight bearing exercise routine is recommended.

## 2025-07-15 NOTE — PROGRESS NOTES
Subjective   Patient ID: Zaki Terry is a 74 y.o. female who presents for Follow-up (6 month f/u).    HPI   This is 72 yo female with complex medical problems including HLD, Depression, osteopenia, lumbar radiculitis, urinary frequency, cognitive complaints.      We reviewed her medical conditions during today's visit.    Patient has been feeling pretty good and has been complaint with prescribed medications.    Concerned about cerumen impaction.    We reviewed and discussed details of recent blood work: CBC, CMP, TSH, Lipid panel done in Jan 2025. Results within acceptable range.    C/o urinary frequency and urge incontinence.   She consulted urology, advised lifestyle modifications and special exercise routine.  Still having symptoms, not interested in starting medication or procedures.        Patient saw neurologist regarding memory issues.  CT brain showed mild atrophy, slight dilatation of right temporal horn of lateral ventricle.  Advised NP testing and Vit B12 level. Her Vit Vit [id number] was 746.     She underwent NP testing, no significant cognitive impairment  noted, it appears that her sx could be related to depression/anxiety.  She was advised that stress, anxiety, depression and fatigue may affect cognitive effectiveness.       She continues fluoxetine for anxiety sx.       Patient is requesting that I refill her gabapentin,  initially prescribed by pain management, she no longer sees pain management, gabapentin helps with radiculopathy sx.   There is no signs symptoms of gabapentin misuse or overuse.     Review of Systems   Constitutional: Negative.  Negative for activity change, appetite change and unexpected weight change.   HENT: Negative.  Negative for congestion, ear discharge, ear pain, hearing loss, mouth sores, nosebleeds, sinus pressure, sinus pain, sore throat, trouble swallowing and voice change.    Eyes: Negative.  Negative for pain, discharge, redness and visual disturbance.  "  Respiratory: Negative.  Negative for cough, chest tightness, shortness of breath, wheezing and stridor.    Cardiovascular: Negative.  Negative for chest pain, palpitations and leg swelling.   Gastrointestinal: Negative.  Negative for abdominal pain, blood in stool, constipation, diarrhea, nausea and vomiting.   Endocrine: Negative.  Negative for polydipsia, polyphagia and polyuria.   Genitourinary:  Positive for frequency and urgency. Negative for difficulty urinating, dysuria and flank pain.   Musculoskeletal:  Positive for arthralgias and back pain. Negative for gait problem, joint swelling, myalgias, neck pain and neck stiffness.   Skin: Negative.  Negative for color change, rash and wound.   Allergic/Immunologic: Negative.  Negative for environmental allergies, food allergies and immunocompromised state.   Neurological: Negative.  Negative for dizziness, tremors, seizures, syncope, facial asymmetry, speech difficulty, weakness, light-headedness, numbness and headaches.   Hematological: Negative.  Negative for adenopathy. Does not bruise/bleed easily.   Psychiatric/Behavioral: Negative.  Negative for agitation, behavioral problems, confusion, hallucinations, sleep disturbance and suicidal ideas. The patient is not nervous/anxious.    All other systems reviewed and are negative.      Objective   /52 (BP Location: Left arm, Patient Position: Sitting, BP Cuff Size: Adult)   Pulse 74   Temp 36.3 °C (97.4 °F) (Skin)   Resp 18   Ht 1.631 m (5' 4.2\")   Wt 67.2 kg (148 lb 3.2 oz)   SpO2 96%   BMI 25.28 kg/m²     Physical Exam  Vitals and nursing note reviewed.   Constitutional:       General: She is not in acute distress.     Appearance: Normal appearance.   HENT:      Head: Normocephalic and atraumatic.      Right Ear: Tympanic membrane, ear canal and external ear normal.      Left Ear: There is impacted cerumen.      Nose: Nose normal. No congestion or rhinorrhea.   Eyes:      General:         Right eye: " No discharge.         Left eye: No discharge.      Extraocular Movements: Extraocular movements intact.      Conjunctiva/sclera: Conjunctivae normal.      Pupils: Pupils are equal, round, and reactive to light.   Cardiovascular:      Rate and Rhythm: Normal rate and regular rhythm.      Pulses: Normal pulses.      Heart sounds: Normal heart sounds. No murmur heard.     No friction rub. No gallop.   Pulmonary:      Effort: Pulmonary effort is normal. No respiratory distress.      Breath sounds: Normal breath sounds. No stridor. No wheezing, rhonchi or rales.   Chest:      Chest wall: No tenderness.   Abdominal:      General: Bowel sounds are normal.      Palpations: Abdomen is soft. There is no mass.      Tenderness: There is no abdominal tenderness. There is no guarding or rebound.   Musculoskeletal:         General: No swelling or deformity. Normal range of motion.      Cervical back: Normal range of motion and neck supple. No rigidity or tenderness.      Right lower leg: No edema.      Left lower leg: No edema.   Lymphadenopathy:      Cervical: No cervical adenopathy.   Skin:     General: Skin is warm and dry.      Coloration: Skin is not jaundiced.      Findings: No bruising or erythema.   Neurological:      General: No focal deficit present.      Mental Status: She is alert and oriented to person, place, and time. Mental status is at baseline.      Cranial Nerves: No cranial nerve deficit.      Motor: No weakness.      Coordination: Coordination normal.      Gait: Gait normal.   Psychiatric:         Mood and Affect: Mood normal.         Behavior: Behavior normal.         Thought Content: Thought content normal.         Judgment: Judgment normal.     I personally removed cerumen from left ear canal with instrument. Inspection of  left TM revealed intact TM.  Patient tolerated procedure well.    Assessment/Plan   Problem List Items Addressed This Visit           ICD-10-CM       Cardiac and Vasculature     Hyperlipidemia - Primary E78.5    Low cholesterol and low carbohydrate diet is advised.                   ENT    Impacted cerumen of left ear H61.22    Cerumen removed with ear curette.             Endocrine/Metabolic    Osteopenia M85.80    Please maintain enough calcium in your diet:  2198-8061 mg daily (consume foods rich in calcium rather than taking only calcium supplement to meet daily calcium requirements), take daily Vitamin D supplement with meal. Average Vit D dose is 2000 international units daily.  Weight bearing exercise routine is recommended.                   Health Encounters    Routine general medical examination at health care facility Z00.00       Mental Health    Major depressive disorder, recurrent, moderate F33.1    Relevant Medications    FLUoxetine (PROzac) 20 mg capsule       Neuro    Lumbar radiculitis M54.16    Clinically stable. Symptoms controlled with Gabapentin.                It was a pleasure to see you today.  I would like to remind you about importance of a healthy lifestyle in order to improve your well-being and live longer.  Try to engage in physical activities for at least 150 minutes per week.  Eat about 10 servings of fruits and vegetables daily. My advice is 2 servings of fruits and 8 servings of vegetables.  For vegetables choose at least half of them green and at least half of them fresh.  Please avoid sugar, salt, fried food and saturated fat.    I spent a total of 30 minutes on the date of service for follow up visit, which included preparing to see the patient, face-to-face patient care, completing clinical documentation, obtaining and/or reviewing separately obtained history, performing a medically appropriate examination, counseling and educating the patient/family/caregiver, ordering medications, tests, or procedures, communicating with other health care providers (not separately reported), independently interpreting results (not separately reported), communicating  results to the patient/family/caregiver, and care coordination (not separately reported).      F/up in 6 months or sooner if needed.

## 2026-01-19 ENCOUNTER — APPOINTMENT (OUTPATIENT)
Dept: PRIMARY CARE | Facility: CLINIC | Age: 76
End: 2026-01-19
Payer: MEDICARE